# Patient Record
Sex: MALE | Race: WHITE | NOT HISPANIC OR LATINO | Employment: PART TIME | URBAN - METROPOLITAN AREA
[De-identification: names, ages, dates, MRNs, and addresses within clinical notes are randomized per-mention and may not be internally consistent; named-entity substitution may affect disease eponyms.]

---

## 2017-11-01 ENCOUNTER — OFFICE VISIT (OUTPATIENT)
Dept: MEDICAL GROUP | Facility: MEDICAL CENTER | Age: 42
End: 2017-11-01
Attending: INTERNAL MEDICINE
Payer: MEDICAID

## 2017-11-01 VITALS
SYSTOLIC BLOOD PRESSURE: 122 MMHG | HEIGHT: 70 IN | TEMPERATURE: 97.2 F | HEART RATE: 100 BPM | WEIGHT: 186 LBS | OXYGEN SATURATION: 100 % | BODY MASS INDEX: 26.63 KG/M2 | RESPIRATION RATE: 16 BRPM | DIASTOLIC BLOOD PRESSURE: 88 MMHG

## 2017-11-01 DIAGNOSIS — K92.1 HEMATOCHEZIA: ICD-10-CM

## 2017-11-01 DIAGNOSIS — F42.9 OBSESSIVE-COMPULSIVE DISORDER, UNSPECIFIED TYPE: ICD-10-CM

## 2017-11-01 DIAGNOSIS — Z13.1 SCREENING FOR DIABETES MELLITUS: ICD-10-CM

## 2017-11-01 DIAGNOSIS — F10.20 ALCOHOLISM (HCC): ICD-10-CM

## 2017-11-01 DIAGNOSIS — Z11.59 SCREENING FOR VIRAL DISEASE: ICD-10-CM

## 2017-11-01 DIAGNOSIS — Z13.220 SCREENING, LIPID: ICD-10-CM

## 2017-11-01 DIAGNOSIS — K21.9 GASTROESOPHAGEAL REFLUX DISEASE, ESOPHAGITIS PRESENCE NOT SPECIFIED: ICD-10-CM

## 2017-11-01 DIAGNOSIS — Z23 NEED FOR VACCINATION: ICD-10-CM

## 2017-11-01 PROBLEM — F15.21 METHAMPHETAMINE DEPENDENCE IN REMISSION (HCC): Status: ACTIVE | Noted: 2017-11-01

## 2017-11-01 PROBLEM — G47.00 INSOMNIA: Status: ACTIVE | Noted: 2017-11-01

## 2017-11-01 PROCEDURE — 90471 IMMUNIZATION ADMIN: CPT | Performed by: INTERNAL MEDICINE

## 2017-11-01 PROCEDURE — 99204 OFFICE O/P NEW MOD 45 MIN: CPT | Mod: 25 | Performed by: INTERNAL MEDICINE

## 2017-11-01 PROCEDURE — 90715 TDAP VACCINE 7 YRS/> IM: CPT | Performed by: INTERNAL MEDICINE

## 2017-11-01 PROCEDURE — 90686 IIV4 VACC NO PRSV 0.5 ML IM: CPT | Performed by: INTERNAL MEDICINE

## 2017-11-01 PROCEDURE — 99214 OFFICE O/P EST MOD 30 MIN: CPT | Mod: 25 | Performed by: INTERNAL MEDICINE

## 2017-11-01 PROCEDURE — 90732 PPSV23 VACC 2 YRS+ SUBQ/IM: CPT | Performed by: INTERNAL MEDICINE

## 2017-11-01 RX ORDER — BUPROPION HYDROCHLORIDE 150 MG/1
150 TABLET, EXTENDED RELEASE ORAL DAILY
Qty: 30 TAB | Refills: 3 | Status: SHIPPED | OUTPATIENT
Start: 2017-11-01 | End: 2020-02-28 | Stop reason: SDUPTHER

## 2017-11-01 RX ORDER — BUPROPION HYDROCHLORIDE 150 MG/1
150 TABLET, EXTENDED RELEASE ORAL 2 TIMES DAILY
Qty: 60 TAB | Status: SHIPPED | DISCHARGE
Start: 2017-11-01 | End: 2017-11-01

## 2017-11-01 RX ORDER — FLUOXETINE HYDROCHLORIDE 40 MG/1
40 CAPSULE ORAL DAILY
Qty: 30 CAP | Refills: 6 | Status: SHIPPED | OUTPATIENT
Start: 2017-11-01 | End: 2018-11-15

## 2017-11-01 ASSESSMENT — PAIN SCALES - GENERAL: PAINLEVEL: NO PAIN

## 2017-11-01 NOTE — ASSESSMENT & PLAN NOTE
Patient reports suffering from severe GERD for at least 15 years. He's been on omeprazole which he buys over-the-counter daily since then. He states that he does not take a dose of his omeprazole for a day, he will be able to eat because he will have such severe heartburn. While on the medication, he tolerates most food and denies nausea and vomiting. He has never seen a GI specialist or had an upper endoscopy.

## 2017-11-01 NOTE — ASSESSMENT & PLAN NOTE
Patient reports being diagnosed with obsessive-compulsive disorder as an adolescent, but has had this problem since childhood. He started on fluoxetine as a teenager which really helped him. He went off of it for many years, but once he started drinking heavily and using methamphetamine, his OCD traits started to come back very strong. About 3 years ago, he restarted the Prozac and states that he has been stable on it since then. He does have some sexual dysfunction as a side effect for which she takes Wellbutrin. He currently takes 100 mg daily. States this does help a little bit but not dramatically.

## 2017-11-01 NOTE — PROGRESS NOTES
Bernabe Horn III is a 42 y.o. male here for severe acid reflux, refills on medications, hematochezia, establish care    HPI: No previous PCP  GERD (gastroesophageal reflux disease)  Patient reports suffering from severe GERD for at least 15 years. He's been on omeprazole which he buys over-the-counter daily since then. He states that he does not take a dose of his omeprazole for a day, he will be able to eat because he will have such severe heartburn. While on the medication, he tolerates most food and denies nausea and vomiting. He has never seen a GI specialist or had an upper endoscopy.     OCD (obsessive compulsive disorder)  Patient reports being diagnosed with obsessive-compulsive disorder as an adolescent, but has had this problem since childhood. He started on fluoxetine as a teenager which really helped him. He went off of it for many years, but once he started drinking heavily and using methamphetamine, his OCD traits started to come back very strong. About 3 years ago, he restarted the Prozac and states that he has been stable on it since then. He does have some sexual dysfunction as a side effect for which she takes Wellbutrin. He currently takes 100 mg daily. States this does help a little bit but not dramatically.     Hematochezia  Patient reports that several times in the past year, he has had bright red blood in his bowel movements. He states it has been quite a bit of blood, more than just on the toilet paper and a pink tinge to the toilet bowl. He has never gone to the emergency room or sought medical attention for these episodes. His mother was recently diagnosed with stage IV colon cancer and  about a month later at age 60 and he is concerned for this possibility. He denies abdominal pain, unintentional weight loss, fevers, night sweats.    Alcoholism (CMS-[facility])  Patient has a history of heavy drinking. Up until about 3 years ago, he was drinking a fifth of hard alcohol a day. He has  since cut back to about a sixpack of beer a day, but he tends to drink high alcohol-containing beers. He will have some hard alcohol on the weekends. States that he does not have tremors or DT symptoms when he stops drinking.    Current medicines (including changes today)  Current Outpatient Prescriptions   Medication Sig Dispense Refill   • fluoxetine (PROZAC) 40 MG capsule Take 1 Cap by mouth every day. 30 Cap 6   • buPROPion SR (WELLBUTRIN-SR) 150 MG TABLET SR 12 HR sustained-release tablet Take 1 Tab by mouth every day. 30 Tab 3   • omeprazole (PRILOSEC) 20 MG delayed-release capsule Take 20 mg by mouth every day.     • diphenhydrAMINE (BENADRYL) 25 MG TABS Take 75 mg by mouth at bedtime as needed for Sleep.       No current facility-administered medications for this visit.      He  has a past medical history of Asthma; Heart burn; Hyperlipidemia; Obsessive compulsive disorder; and Substance abuse.  He  has a past surgical history that includes external fixator application (Right, 5/22/2015); external fixator removal (Right, 6/5/2015); and ankle orif (Right, 6/5/2015).  Social History   Substance Use Topics   • Smoking status: Current Every Day Smoker     Packs/day: 1.00     Years: 18.00     Types: Cigarettes   • Smokeless tobacco: Never Used   • Alcohol use 25.2 oz/week     42 Cans of beer per week      Comment: 4 years drank 1/5 a day and cut back to 6 pack a day      Social History     Social History Narrative   • No narrative on file     Family History   Problem Relation Age of Onset   • Cancer Mother 60     colon   • Diabetes Mother    • Heart Disease Father    • Hyperlipidemia Father    • Alcohol/Drug Father    • Cancer Maternal Grandfather 75     colon   • Heart Disease Paternal Grandfather    • Alcohol/Drug Paternal Grandfather    • Stroke Neg Hx    • Psychiatry Neg Hx        ROS  As above in HPI  All other systems reviewed and are negative     Objective:     Blood pressure 122/88, pulse 100, temperature  "36.2 °C (97.2 °F), resp. rate 16, height 1.778 m (5' 10\"), weight 84.4 kg (186 lb), SpO2 100 %. Body mass index is 26.69 kg/m².  Physical Exam:    Constitutional: Alert, no distress.  Skin: Warm, dry, good turgor, no rashes in visible areas.  Eye: Equal, round and reactive, conjunctiva clear, lids normal.  ENMT: Lips without lesions, good dentition, oropharynx clear, TM's clear bilaterally.  Neck: Trachea midline, no masses, no thyromegaly. No cervical or supraclavicular lymphadenopathy.  Respiratory: Unlabored respiratory effort, lungs clear to auscultation, no wheezes, no ronchi.  Cardiovascular: Normal S1, S2, no murmur, no edema.  Abdomen: Soft, non-tender, no masses, no hepatosplenomegaly.  Psych: Alert and oriented x3, normal affect and mood.        Assessment and Plan:   The following treatment plan was discussed    1. Gastroesophageal reflux disease, esophagitis presence not specified  Severe, likely related to alcohol intake. I discussed this association with the patient, alcoholic gastritisAs a possible etiology. I do think he would benefit from an upper endoscopy to evaluate for Garvey's esophagus because he's had his symptoms for 15 years. I placed a referral to GI. We will check a CBC to ensure he is not anemic.  Cont PPI  - REFERRAL TO GASTROENTEROLOGY  - CBC WITHOUT DIFFERENTIAL; Future  -continue OTC omeprazole    2. Obsessive-compulsive disorder, unspecified type  Stable, well controlled with Prozac. Patient is requesting a slight increase in his bupropion dose to see if this will help more with his sexual dysfunction. I have increased him up 250 mg once a day of the Wellbutrin.  - fluoxetine (PROZAC) 40 MG capsule; Take 1 Cap by mouth every day.  Dispense: 30 Cap; Refill: 6  - buPROPion SR (WELLBUTRIN-SR) 150 MG TABLET SR 12 HR sustained-release tablet; Take 1 Tab by mouth every day.  Dispense: 30 Tab; Refill: 3    3. Alcoholism (CMS-[facility])  Patient has cut back significantly compared to past but is " still heavy drinker. Discussed further reducing his alcohol consumption. We will obtain a metabolic panel to evaluate for any liver injury.  - COMP METABOLIC PANEL; Future    4. Hematochezia  Although rare, patient has never had any diagnostic or sutures to evaluate his hematochezia and I placed a GI referral today given his family history of colon cancer.  - REFERRAL TO GASTROENTEROLOGY    5. Screening for diabetes mellitus  - HEMOGLOBIN A1C; Future    6. Screening, lipid  Reports being told he had very high cholesterol in the past but never followed up.  - LIPID PROFILE; Future    7. Screening for viral disease  Given history of methamphetamine use and sharing straws, we'll check for hepatitis and HIV  - HEPATITIS PANEL ACUTE(4 COMPONENTS); Future  - HIV ANTIBODIES; Future    8. Need for vaccination  - Flu Quad Inj >3 Year Pre-Filled PF  - Tdap =>6yo IM  - PNEUMOCOCCAL POLYSACCHARIDE VACCINE 23-VALENT =>3YO SQ/IM        Followup: Return in about 6 months (around 5/1/2018), or if symptoms worsen or fail to improve.

## 2017-11-01 NOTE — ASSESSMENT & PLAN NOTE
Patient has a history of heavy drinking. Up until about 3 years ago, he was drinking a fifth of hard alcohol a day. He has since cut back to about a sixpack of beer a day, but he tends to drink high alcohol-containing beers. He will have some hard alcohol on the weekends. States that he does not have tremors or DT symptoms when he stops drinking.

## 2017-11-01 NOTE — ASSESSMENT & PLAN NOTE
Patient reports that several times in the past year, he has had bright red blood in his bowel movements. He states it has been quite a bit of blood, more than just on the toilet paper and a pink tinge to the toilet bowl. He has never gone to the emergency room or sought medical attention for these episodes. His mother was recently diagnosed with stage IV colon cancer and  about a month later at age 60 and he is concerned for this possibility. He denies abdominal pain, unintentional weight loss, fevers, night sweats.

## 2017-11-21 ENCOUNTER — TELEPHONE (OUTPATIENT)
Dept: MEDICAL GROUP | Facility: MEDICAL CENTER | Age: 42
End: 2017-11-21

## 2017-11-21 PROBLEM — E78.5 HYPERLIPIDEMIA: Status: ACTIVE | Noted: 2017-11-21

## 2017-11-21 RX ORDER — ATORVASTATIN CALCIUM 20 MG/1
20 TABLET, FILM COATED ORAL DAILY
Qty: 30 TAB | Refills: 3 | Status: SHIPPED | OUTPATIENT
Start: 2017-11-21 | End: 2017-12-06

## 2017-11-21 NOTE — TELEPHONE ENCOUNTER
Please inform patient his labs showed high cholesterol, and in his situation, we should start him on a medication to reduce his cholesterol in order to prevent strokes and heart attacks in the future.  It is called atorvastatin, and it will be one pill once daily.  I have sent the script to his pharmacy.  Please let him know I am happy to see him for an appointment if he would like to discuss the medication /options before starting it.  All other labs came back normal, including negative hepatitis and HIV testing.    Talia Wilson M.D.

## 2017-12-06 ENCOUNTER — OFFICE VISIT (OUTPATIENT)
Dept: MEDICAL GROUP | Facility: MEDICAL CENTER | Age: 42
End: 2017-12-06
Attending: INTERNAL MEDICINE
Payer: MEDICAID

## 2017-12-06 VITALS
WEIGHT: 186 LBS | TEMPERATURE: 98.6 F | HEIGHT: 70 IN | BODY MASS INDEX: 26.63 KG/M2 | SYSTOLIC BLOOD PRESSURE: 110 MMHG | HEART RATE: 88 BPM | RESPIRATION RATE: 16 BRPM | DIASTOLIC BLOOD PRESSURE: 70 MMHG | OXYGEN SATURATION: 95 %

## 2017-12-06 DIAGNOSIS — Z72.0 TOBACCO USE: ICD-10-CM

## 2017-12-06 DIAGNOSIS — E78.00 PURE HYPERCHOLESTEROLEMIA: ICD-10-CM

## 2017-12-06 DIAGNOSIS — K21.9 GASTROESOPHAGEAL REFLUX DISEASE, ESOPHAGITIS PRESENCE NOT SPECIFIED: ICD-10-CM

## 2017-12-06 PROCEDURE — 99213 OFFICE O/P EST LOW 20 MIN: CPT | Performed by: INTERNAL MEDICINE

## 2017-12-06 PROCEDURE — 99214 OFFICE O/P EST MOD 30 MIN: CPT | Performed by: INTERNAL MEDICINE

## 2017-12-06 RX ORDER — OMEPRAZOLE 20 MG/1
20 TABLET, DELAYED RELEASE ORAL DAILY
Qty: 30 TAB | Refills: 6 | Status: SHIPPED | OUTPATIENT
Start: 2017-12-06 | End: 2018-07-22 | Stop reason: SDUPTHER

## 2017-12-06 ASSESSMENT — PAIN SCALES - GENERAL: PAINLEVEL: NO PAIN

## 2017-12-06 ASSESSMENT — PATIENT HEALTH QUESTIONNAIRE - PHQ9: CLINICAL INTERPRETATION OF PHQ2 SCORE: 0

## 2017-12-06 NOTE — ASSESSMENT & PLAN NOTE
Patient continues to smoke one pack of cigarettes per day. We discussed that his smoking is one of the main  as for elevating his risk of heart attack and stroke and one of the main reasons why I would recommend cholesterol medication. He is interested in cutting back on smoking but not stopping completely at this point.

## 2017-12-06 NOTE — ASSESSMENT & PLAN NOTE
Patient presents today for review of his recent lab work. This was done through [facility]. His ASCVD risk score was calculated at 8.5% and I had recommended he start on Lipitor. He would like to know more about diet changes today. He states that he eats a lot of high calorie processed foods currently. Since finding out about his cholesterol, he has changed his diet to incorporate more fruits and vegetables, food with low cholesterol, and has been eating 3 meals a day instead of 2 large meals a day.

## 2017-12-06 NOTE — ASSESSMENT & PLAN NOTE
Patient reports a daily GERD symptoms if he does not take over-the-counter Prilosec. He has been buying this but is interested in having it prescribed if possible. He has never tried any other medication for heartburn. We discussed that this medication may not be approved by his insurance, and he is okay buying it if this is the case.

## 2017-12-06 NOTE — PROGRESS NOTES
Subjective:   Bernabe Horn III is a 42 y.o. male here today for Follow-up lab results    Hyperlipidemia  Patient presents today for review of his recent lab work. This was done through [facility]. His ASCVD risk score was calculated at 8.5% and I had recommended he start on Lipitor. He would like to know more about diet changes today. He states that he eats a lot of high calorie processed foods currently. Since finding out about his cholesterol, he has changed his diet to incorporate more fruits and vegetables, food with low cholesterol, and has been eating 3 meals a day instead of 2 large meals a day.    Tobacco use  Patient continues to smoke one pack of cigarettes per day. We discussed that his smoking is one of the main  as for elevating his risk of heart attack and stroke and one of the main reasons why I would recommend cholesterol medication. He is interested in cutting back on smoking but not stopping completely at this point.    GERD (gastroesophageal reflux disease)  Patient reports a daily GERD symptoms if he does not take over-the-counter Prilosec. He has been buying this but is interested in having it prescribed if possible. He has never tried any other medication for heartburn. We discussed that this medication may not be approved by his insurance, and he is okay buying it if this is the case.       Current medicines (including changes today)  Current Outpatient Prescriptions   Medication Sig Dispense Refill   • omeprazole (PRILOSEC OTC) 20 MG tablet Take 1 Tab by mouth every day. 30 Tab 6   • fluoxetine (PROZAC) 40 MG capsule Take 1 Cap by mouth every day. 30 Cap 6   • buPROPion SR (WELLBUTRIN-SR) 150 MG TABLET SR 12 HR sustained-release tablet Take 1 Tab by mouth every day. 30 Tab 3   • diphenhydrAMINE (BENADRYL) 25 MG TABS Take 75 mg by mouth at bedtime as needed for Sleep.       No current facility-administered medications for this visit.      He  has a past medical history of Asthma;  "Heart burn; Hyperlipidemia; Obsessive compulsive disorder; and Substance abuse.    ROS   As above in HPI     Objective:     Blood pressure 110/70, pulse 88, temperature 37 °C (98.6 °F), resp. rate 16, height 1.778 m (5' 10\"), weight 84.4 kg (186 lb), SpO2 95 %. Body mass index is 26.69 kg/m².   Physical Exam:  Constitutional: Alert, no distress.  Skin: Warm, dry, good turgor, no rashes in visible areas.  Eye: Equal, round and reactive, conjunctiva clear, lids normal.  Psych: Alert and oriented x3, normal affect and mood.    Results and Imaging:   Labs (see scanned media)  Lipids: , HDL 43, ,   Hepatitis panel negative  HIV negative  CBC and CMP normal    Assessment and Plan:   The following treatment plan was discussed    1. Pure hypercholesterolemia  Discussed with patient decision to start statin based on ASCVD risk score.  We reviewed how this would change if he were to stop smoking or if his cholesterol came down to normal limits. Patient is interested in intensive lifestyle modification for the next 6 months. He has already significantly changed his diet. He would like to hold off on any medication for now and work on quitting smoking and improving his diet. We will repeat his cholesterol in 6 months.  -Hold off on atorvastatin, lifestyle modifications  -Repeat lipids in 6 months    2. Tobacco use  Discussed smoking cessation, patient is willing to cut back but not stop completely at this point. We will continue to encourage him to quit.    3. Gastroesophageal reflux disease, esophagitis presence not specified  Stable, well controlled with omeprazole daily. Prescription sent today however discussed with patient that this may not be covered by insurance.  If this is the case, he is willing to continue paying for it out of pocket.  -Continue omeprazole 20 mg daily      Followup: Return in about 6 months (around 6/6/2018) for hyperlipidemia.         "

## 2018-03-13 PROBLEM — K22.70 BARRETT'S ESOPHAGUS WITHOUT DYSPLASIA: Status: ACTIVE | Noted: 2018-03-13

## 2018-03-23 PROBLEM — K57.30 DIVERTICULOSIS OF LARGE INTESTINE WITHOUT HEMORRHAGE: Status: ACTIVE | Noted: 2018-03-23

## 2018-03-23 PROBLEM — K44.9 HIATAL HERNIA: Status: ACTIVE | Noted: 2018-03-23

## 2018-04-27 ENCOUNTER — OFFICE VISIT (OUTPATIENT)
Dept: MEDICAL GROUP | Facility: MEDICAL CENTER | Age: 43
End: 2018-04-27
Attending: INTERNAL MEDICINE
Payer: MEDICAID

## 2018-04-27 VITALS
TEMPERATURE: 98.1 F | SYSTOLIC BLOOD PRESSURE: 100 MMHG | HEIGHT: 70 IN | RESPIRATION RATE: 16 BRPM | OXYGEN SATURATION: 94 % | WEIGHT: 186 LBS | BODY MASS INDEX: 26.63 KG/M2 | DIASTOLIC BLOOD PRESSURE: 78 MMHG | HEART RATE: 98 BPM

## 2018-04-27 DIAGNOSIS — M79.629 AXILLARY TENDERNESS, UNSPECIFIED LATERALITY: ICD-10-CM

## 2018-04-27 DIAGNOSIS — R51.9 FACIAL PAIN: ICD-10-CM

## 2018-04-27 PROCEDURE — 99212 OFFICE O/P EST SF 10 MIN: CPT | Performed by: INTERNAL MEDICINE

## 2018-04-27 PROCEDURE — 99214 OFFICE O/P EST MOD 30 MIN: CPT | Performed by: INTERNAL MEDICINE

## 2018-04-27 RX ORDER — TRIAMCINOLONE ACETONIDE 1 MG/G
OINTMENT TOPICAL
Qty: 45 G | Refills: 1 | Status: SHIPPED
Start: 2018-04-27 | End: 2020-02-28

## 2018-04-27 ASSESSMENT — PAIN SCALES - GENERAL: PAINLEVEL: 3=SLIGHT PAIN

## 2018-04-27 NOTE — ASSESSMENT & PLAN NOTE
Patient reports that several months ago, he was using his normal deodorant when he started to develop severe itching in both of his armpits. Scratch them quite a bit and they became very red and raw. He stopped using the deodorant and started just using a mild baby shampoo in the armpit region. Over the period of a month or so, the itching went away and the skin healed. He then went back to using the deodorant and started to develop a similar reaction. He tried some athlete's foot cream without improvement. He denies any significant abscess formation or drainage.  He has stopped using the deodorant again and is improving slightly.

## 2018-04-27 NOTE — ASSESSMENT & PLAN NOTE
Patient reports that for about the past month, he has had pain over his right maxillary region radiating back to his ear. States that preceding this, he was having severe pain in his teeth. He went to the dentist and was told that he had a fractured an impacted wisdom tooth. He is in the process of getting insurance approval to have it removed. He reports that the pain from this subsided but then he started to have a much less intense pain similar to what he is experiencing today. He denies any upper respiratory symptoms including runny nose, cough, fevers, chills, ear pain or drainage. He does not believe that he clenches his teeth at night although he does state that with his OCD he grinds and has some jaw movements that are accessory.

## 2018-04-27 NOTE — PROGRESS NOTES
Subjective:   Bernabe Horn III is a 42 y.o. male here today for facial pain, armpit itching    Facial pain  Patient reports that for about the past month, he has had pain over his right maxillary region radiating back to his ear. States that preceding this, he was having severe pain in his teeth. He went to the dentist and was told that he had a fractured an impacted wisdom tooth. He is in the process of getting insurance approval to have it removed. He reports that the pain from this subsided but then he started to have a much less intense pain similar to what he is experiencing today. He denies any upper respiratory symptoms including runny nose, cough, fevers, chills, ear pain or drainage. He does not believe that he clenches his teeth at night although he does state that with his OCD he grinds and has some jaw movements that are accessory.     Sore armpit  Patient reports that several months ago, he was using his normal deodorant when he started to develop severe itching in both of his armpits. Scratch them quite a bit and they became very red and raw. He stopped using the deodorant and started just using a mild baby shampoo in the armpit region. Over the period of a month or so, the itching went away and the skin healed. He then went back to using the deodorant and started to develop a similar reaction. He tried some athlete's foot cream without improvement. He denies any significant abscess formation or drainage.  He has stopped using the deodorant again and is improving slightly.       Current medicines (including changes today)  Current Outpatient Prescriptions   Medication Sig Dispense Refill   • triamcinolone acetonide (KENALOG) 0.1 % Ointment Apply thin layer to rash beneath arm pits twice daily as needed 45 g 1   • omeprazole (PRILOSEC OTC) 20 MG tablet Take 1 Tab by mouth every day. 30 Tab 6   • fluoxetine (PROZAC) 40 MG capsule Take 1 Cap by mouth every day. 30 Cap 6   • buPROPion SR  "(WELLBUTRIN-SR) 150 MG TABLET SR 12 HR sustained-release tablet Take 1 Tab by mouth every day. 30 Tab 3   • diphenhydrAMINE (BENADRYL) 25 MG TABS Take 75 mg by mouth at bedtime as needed for Sleep.       No current facility-administered medications for this visit.      He  has a past medical history of Asthma; Heart burn; Hyperlipidemia; Obsessive compulsive disorder; and Substance abuse.    ROS   As above in HPI     Objective:     Blood pressure 100/78, pulse 98, temperature 36.7 °C (98.1 °F), resp. rate 16, height 1.778 m (5' 10\"), weight 84.4 kg (186 lb), SpO2 94 %. Body mass index is 26.69 kg/m².   Physical Exam:  Constitutional: Alert, no distress.  Skin: Warm, dry, good turgor, red mildly erythematous skin in axillary region without obvious abscess, no adenopathy, some excoriations..  Eye: Equal, round and reactive, conjunctiva clear, lids normal.  ENMT: Lips without lesions, fair dentition, no obvious dental abscess, no tenderness to palpation over frontal or maxillary sinuses bilaterally, TM's clear bilaterally, nose normal, no jaw clicking/catching/popping, no tenderness to palpation over TMJ  Psych: Alert and oriented x3, normal affect and mood.      Assessment and Plan:   The following treatment plan was discussed    1. Facial pain  Evidence of acute sinusitis on physical exam and history is not suggestive of this. Differential would also include referred dental pain and he does have a known impacted wisdom tooth. Concern for TMJ symptoms as well although patient does not believe he clenches his jaw a lot. No evidence of infection at this time. I encouraged him to follow-up with his dentist and to proceed with this symptom tooth extraction as this may resolve his pain. We also discussed wearing a  if he starts developing worsening jaw symptoms. He will let us know if this is ineffective.  -Follow-up with dentistry for tooth extraction    2. Axillary tenderness, unspecified laterality  Appears " to be a hypersensitivity reaction, likely to a component of the deodorant. We discussed avoiding use of the deodorant and we will try a topical corticosteroid. Encouraged patient to use twice daily for a week and if no improvement to let us know. There is no evidence of abscess or infection.  - triamcinolone acetonide (KENALOG) 0.1 % Ointment; Apply thin layer to rash beneath arm pits twice daily as needed  Dispense: 45 g; Refill: 1        Followup: Return if symptoms worsen or fail to improve.

## 2018-07-24 RX ORDER — OMEPRAZOLE 20 MG/1
CAPSULE, DELAYED RELEASE ORAL
Qty: 30 CAP | Refills: 5 | Status: SHIPPED | OUTPATIENT
Start: 2018-07-24 | End: 2019-07-27 | Stop reason: SDUPTHER

## 2018-11-14 DIAGNOSIS — F42.9 OBSESSIVE-COMPULSIVE DISORDER, UNSPECIFIED TYPE: ICD-10-CM

## 2018-11-15 ENCOUNTER — TELEPHONE (OUTPATIENT)
Dept: MEDICAL GROUP | Facility: MEDICAL CENTER | Age: 43
End: 2018-11-15

## 2018-11-15 RX ORDER — FLUOXETINE HYDROCHLORIDE 20 MG/1
40 CAPSULE ORAL DAILY
Qty: 60 CAP | Refills: 11 | Status: SHIPPED | OUTPATIENT
Start: 2018-11-15 | End: 2019-12-31 | Stop reason: SDUPTHER

## 2018-11-15 RX ORDER — FLUOXETINE HYDROCHLORIDE 20 MG/1
CAPSULE ORAL
Qty: 60 CAP | Refills: 5 | OUTPATIENT
Start: 2018-11-15

## 2018-11-15 NOTE — TELEPHONE ENCOUNTER
1. Name: Bernabe      Call Back Number: [phone]  Patient approves a detailed voicemail message: yes    2. Which medication(s) is being requested?   FLUoxetine HCl 20 MG TAKE TWO CAPSULES BY MOUTH DAILY     3. What is the preferred Pharmacy?   [id number] - [place], [place] - [address] AT [place]    Patient was informed they may receive a return phone call from our office with any additional questions before processing this request.

## 2018-11-16 NOTE — TELEPHONE ENCOUNTER
We have in our system that he was on 40 mg capsules 1 daily however patient reports to 20 mg capsules daily so this is what I have ordered.  Refill has been sent.    Talia Wilson M.D.

## 2018-11-16 NOTE — TELEPHONE ENCOUNTER
Already responded to request.  See telephone encounter 11/15/2018.  Medication refill has been sent.  Talia Wilson M.D.

## 2019-07-30 RX ORDER — OMEPRAZOLE 20 MG/1
CAPSULE, DELAYED RELEASE ORAL
Qty: 30 CAP | Refills: 5 | Status: SHIPPED | OUTPATIENT
Start: 2019-07-30 | End: 2020-02-28 | Stop reason: SDUPTHER

## 2019-12-24 RX ORDER — FLUOXETINE HYDROCHLORIDE 20 MG/1
CAPSULE ORAL
Qty: 60 CAP | Refills: 10 | OUTPATIENT
Start: 2019-12-24

## 2019-12-30 ENCOUNTER — TELEPHONE (OUTPATIENT)
Dept: MEDICAL GROUP | Facility: MEDICAL CENTER | Age: 44
End: 2019-12-30

## 2019-12-30 NOTE — TELEPHONE ENCOUNTER
Pt's wife lvm stating that pt no longer has medicaid and he has an appt on 01/09/2020 to see his new pcp Dr. Mauro. She states that he is out of prozac and is wondering if Dr. Wilson would be willing to do a refill on this med until he can see the new doctoe. Please advise- cb # is [phone] or [phone]

## 2019-12-31 RX ORDER — FLUOXETINE HYDROCHLORIDE 20 MG/1
40 CAPSULE ORAL DAILY
Qty: 60 CAP | Refills: 0 | Status: SHIPPED | OUTPATIENT
Start: 2019-12-31 | End: 2020-02-28 | Stop reason: SDUPTHER

## 2019-12-31 NOTE — TELEPHONE ENCOUNTER
Yes, no problem.  Refill sent for 30 days to his pharmacy.  Please inform patient.  Talia Wilson M.D.

## 2020-02-28 ENCOUNTER — OFFICE VISIT (OUTPATIENT)
Dept: MEDICAL GROUP | Facility: MEDICAL CENTER | Age: 45
End: 2020-02-28
Attending: INTERNAL MEDICINE
Payer: MEDICAID

## 2020-02-28 VITALS
TEMPERATURE: 97.9 F | RESPIRATION RATE: 16 BRPM | HEART RATE: 60 BPM | BODY MASS INDEX: 27.35 KG/M2 | SYSTOLIC BLOOD PRESSURE: 112 MMHG | WEIGHT: 191 LBS | HEIGHT: 70 IN | OXYGEN SATURATION: 100 % | DIASTOLIC BLOOD PRESSURE: 72 MMHG

## 2020-02-28 DIAGNOSIS — Z13.1 SCREENING FOR DIABETES MELLITUS: ICD-10-CM

## 2020-02-28 DIAGNOSIS — K21.9 GASTROESOPHAGEAL REFLUX DISEASE, ESOPHAGITIS PRESENCE NOT SPECIFIED: ICD-10-CM

## 2020-02-28 DIAGNOSIS — E78.00 PURE HYPERCHOLESTEROLEMIA: ICD-10-CM

## 2020-02-28 DIAGNOSIS — R06.83 SNORING: ICD-10-CM

## 2020-02-28 DIAGNOSIS — K92.1 HEMATOCHEZIA: ICD-10-CM

## 2020-02-28 DIAGNOSIS — K22.70 BARRETT'S ESOPHAGUS WITHOUT DYSPLASIA: ICD-10-CM

## 2020-02-28 DIAGNOSIS — F42.9 OBSESSIVE-COMPULSIVE DISORDER, UNSPECIFIED TYPE: ICD-10-CM

## 2020-02-28 DIAGNOSIS — F10.20 ALCOHOLISM (HCC): ICD-10-CM

## 2020-02-28 DIAGNOSIS — R06.01 ORTHOPNEA: ICD-10-CM

## 2020-02-28 PROBLEM — M79.629: Status: RESOLVED | Noted: 2018-04-27 | Resolved: 2020-02-28

## 2020-02-28 PROBLEM — R51.9 FACIAL PAIN: Status: RESOLVED | Noted: 2018-04-27 | Resolved: 2020-02-28

## 2020-02-28 PROCEDURE — 99212 OFFICE O/P EST SF 10 MIN: CPT | Performed by: INTERNAL MEDICINE

## 2020-02-28 PROCEDURE — 99214 OFFICE O/P EST MOD 30 MIN: CPT | Performed by: INTERNAL MEDICINE

## 2020-02-28 RX ORDER — OMEPRAZOLE 20 MG/1
20 CAPSULE, DELAYED RELEASE ORAL DAILY
Qty: 30 CAP | Refills: 5 | Status: SHIPPED | OUTPATIENT
Start: 2020-02-28 | End: 2020-12-09

## 2020-02-28 RX ORDER — FLUOXETINE HYDROCHLORIDE 20 MG/1
40 CAPSULE ORAL DAILY
Qty: 60 CAP | Refills: 5 | Status: SHIPPED | OUTPATIENT
Start: 2020-02-28 | End: 2021-08-13

## 2020-02-28 RX ORDER — BUPROPION HYDROCHLORIDE 150 MG/1
150 TABLET, EXTENDED RELEASE ORAL DAILY
Qty: 30 TAB | Refills: 5 | Status: SHIPPED | OUTPATIENT
Start: 2020-02-28 | End: 2021-08-13

## 2020-02-28 ASSESSMENT — PATIENT HEALTH QUESTIONNAIRE - PHQ9: CLINICAL INTERPRETATION OF PHQ2 SCORE: 0

## 2020-02-28 ASSESSMENT — PAIN SCALES - GENERAL: PAINLEVEL: NO PAIN

## 2020-02-29 NOTE — ASSESSMENT & PLAN NOTE
He has a history of hyperlipidemia with an ASCVD risk score of 8.5 based on last labs however has declined cholesterol medication in the past.

## 2020-02-29 NOTE — ASSESSMENT & PLAN NOTE
He remains on Prozac 40 mg daily which he reports control his symptoms well.  He takes the Wellbutrin 150 mg daily to help with the sexual side effects of the Prozac.

## 2020-02-29 NOTE — ASSESSMENT & PLAN NOTE
States that he is continued to have fairly regular episodes of hematochezia.  He has a history of hemorrhoids.  He did have a colonoscopy in 2018 which showed 1 polyp but no obvious source of bleeding.

## 2020-02-29 NOTE — ASSESSMENT & PLAN NOTE
He reports excessive daytime sleepiness, nocturnal awakenings with shortness of breath, and observed orthopneic events by his wife.  His wife tells him he is a very heavy snorer when he sleeps.  He has never been evaluated for sleep apnea.

## 2020-02-29 NOTE — ASSESSMENT & PLAN NOTE
He is currently drinking 12-15 beers per night.  States these are the high gravity beers.  He reports that about 5 years ago he was drinking 1/5 of hard alcohol daily.  At that time, he was getting withdrawal symptoms when he stopped drinking and was having morning drinks.  Now, he reports no alcohol in the mornings and denies withdrawal symptoms if he does stop drinking.

## 2020-02-29 NOTE — ASSESSMENT & PLAN NOTE
He has a history of Garvey's esophagus.  His last EGD was in February 2018 and GI had recommended at that time yearly surveillance EGDs.  He has been without insurance so was unable to follow-up.  He has remained on the Prilosec 20 mg daily.  He reports his GERD symptoms are well controlled on this regimen.

## 2020-02-29 NOTE — PROGRESS NOTES
Subjective:   Bernabe Horn III is a 44 y.o. male here today for shortness of breath while sleeping at night, medication refills, chronic medical problems as below    Alcoholism (CMS-HCC) (HCC)  He is currently drinking 12-15 beers per night.  States these are the high gravity beers.  He reports that about 5 years ago he was drinking 1/5 of hard alcohol daily.  At that time, he was getting withdrawal symptoms when he stopped drinking and was having morning drinks.  Now, he reports no alcohol in the mornings and denies withdrawal symptoms if he does stop drinking.      Hematochezia  States that he is continued to have fairly regular episodes of hematochezia.  He has a history of hemorrhoids.  He did have a colonoscopy in 2018 which showed 1 polyp but no obvious source of bleeding.      Garvey's esophagus without dysplasia  He has a history of Garvey's esophagus.  His last EGD was in February 2018 and GI had recommended at that time yearly surveillance EGDs.  He has been without insurance so was unable to follow-up.  He has remained on the Prilosec 20 mg daily.  He reports his GERD symptoms are well controlled on this regimen.    Hyperlipidemia  He has a history of hyperlipidemia with an ASCVD risk score of 8.5 based on last labs however has declined cholesterol medication in the past.    OCD (obsessive compulsive disorder)  He remains on Prozac 40 mg daily which he reports control his symptoms well.  He takes the Wellbutrin 150 mg daily to help with the sexual side effects of the Prozac.    Snoring  He reports excessive daytime sleepiness, nocturnal awakenings with shortness of breath, and observed orthopneic events by his wife.  His wife tells him he is a very heavy snorer when he sleeps.  He has never been evaluated for sleep apnea.         Current medicines (including changes today)  Current Outpatient Medications   Medication Sig Dispense Refill   • omeprazole (PRILOSEC) 20 MG delayed-release capsule  Take 1 Cap by mouth every day. 30 Cap 5   • FLUoxetine (PROZAC) 20 MG Cap Take 2 Caps by mouth every day. 60 Cap 5   • buPROPion SR (WELLBUTRIN-SR) 150 MG TABLET SR 12 HR sustained-release tablet Take 1 Tab by mouth every day. 30 Tab 5   • diphenhydrAMINE (BENADRYL) 25 MG TABS Take 75 mg by mouth at bedtime as needed for Sleep.       No current facility-administered medications for this visit.      He  has a past medical history of Asthma, Heart burn, Hyperlipidemia, Obsessive compulsive disorder, and Substance abuse (HCC).    ROS   Denies chest pain, abdominal pain  As above in HPI     Objective:     Vitals:    02/28/20 1556   BP: 112/72   Pulse: 60   Resp: 16   Temp: 36.6 °C (97.9 °F)   SpO2: 100%     Body mass index is 27.41 kg/m².   Physical Exam:  Constitutional: Alert, no distress.  Skin: Warm, dry, good turgor, no rashes in visible areas.  Eye: Equal, round and reactive, conjunctiva clear, lids normal.  ENMT: Tonsils are not enlarged  Neck: Trachea midline, no masses, no thyromegaly. No cervical or supraclavicular lymphadenopathy  Respiratory: Unlabored respiratory effort, lungs clear to auscultation, no wheezes, no ronchi.  Cardiovascular: Regular rate and rhythm, no murmurs appreciated, no lower extremity edema  Abdomen: Soft, non-tender, no masses, no hepatosplenomegaly.  Psych: Alert and oriented x3, normal affect and mood.        Assessment and Plan:   The following treatment plan was discussed    1. Obsessive-compulsive disorder, unspecified type  Stable, well-controlled with current meds which were refilled today  - FLUoxetine (PROZAC) 20 MG Cap; Take 2 Caps by mouth every day.  Dispense: 60 Cap; Refill: 5  - buPROPion SR (WELLBUTRIN-SR) 150 MG TABLET SR 12 HR sustained-release tablet; Take 1 Tab by mouth every day.  Dispense: 30 Tab; Refill: 5    2. Gastroesophageal reflux disease, esophagitis presence not specified  Stable, well-controlled with current meds which were refilled today  - omeprazole  (PRILOSEC) 20 MG delayed-release capsule; Take 1 Cap by mouth every day.  Dispense: 30 Cap; Refill: 5  - Comp Metabolic Panel; Future    3. Garvey's esophagus without dysplasia  He is due for surveillance EGD.  New referral to GI placed since his last visit was 2 years ago  - omeprazole (PRILOSEC) 20 MG delayed-release capsule; Take 1 Cap by mouth every day.  Dispense: 30 Cap; Refill: 5  - REFERRAL TO GASTROENTEROLOGY    4. Alcoholism (HCC)  We discussed the importance of cutting back on his alcohol use.  Discussed that this is likely contributing to not only his heartburn but also his sleeping issues and that sleep apnea, if present, is made significantly worse by alcohol.  At this point, he is not interested in cessation.    5. Hematochezia  With a normal colonoscopy 2 years ago.  However, encouraged him to discuss this with the GI provider when he does follow-up.  We will get a CBC to make sure blood counts are normal.  -He will discuss further with GI  - CBC WITH DIFFERENTIAL; Future    6. Snoring  High suspicion for MARIA D.  We have referred him to sleep medicine for further work-up.  - REFERRAL TO SLEEP STUDIES    7. Orthopnea  - REFERRAL TO SLEEP STUDIES    8. Pure hypercholesterolemia  Currently off of lipid-lowering medication  - Lipid Profile; Future    9. Screening for diabetes mellitus  - HEMOGLOBIN A1C; Future        Followup: Return in about 1 year (around 2/28/2021), or if symptoms worsen or fail to improve.

## 2020-04-28 PROBLEM — G47.33 OSA (OBSTRUCTIVE SLEEP APNEA): Status: ACTIVE | Noted: 2020-02-28

## 2020-04-28 PROBLEM — R06.83 SNORING: Status: RESOLVED | Noted: 2020-02-28 | Resolved: 2020-04-28

## 2020-12-09 DIAGNOSIS — K21.9 GASTROESOPHAGEAL REFLUX DISEASE: ICD-10-CM

## 2020-12-09 DIAGNOSIS — K22.70 BARRETT'S ESOPHAGUS WITHOUT DYSPLASIA: ICD-10-CM

## 2020-12-09 RX ORDER — OMEPRAZOLE 20 MG/1
CAPSULE, DELAYED RELEASE ORAL
Qty: 30 CAP | Refills: 4 | Status: SHIPPED | OUTPATIENT
Start: 2020-12-09 | End: 2021-05-18

## 2021-05-17 DIAGNOSIS — K22.70 BARRETT'S ESOPHAGUS WITHOUT DYSPLASIA: ICD-10-CM

## 2021-05-17 DIAGNOSIS — K21.9 GASTROESOPHAGEAL REFLUX DISEASE: ICD-10-CM

## 2021-05-18 RX ORDER — OMEPRAZOLE 20 MG/1
CAPSULE, DELAYED RELEASE ORAL
Qty: 30 CAPSULE | Refills: 11 | Status: SHIPPED | OUTPATIENT
Start: 2021-05-18 | End: 2022-03-17 | Stop reason: SDUPTHER

## 2021-08-13 ENCOUNTER — OFFICE VISIT (OUTPATIENT)
Dept: MEDICAL GROUP | Facility: MEDICAL CENTER | Age: 46
End: 2021-08-13
Attending: INTERNAL MEDICINE
Payer: MEDICAID

## 2021-08-13 VITALS
RESPIRATION RATE: 16 BRPM | WEIGHT: 185 LBS | BODY MASS INDEX: 26.48 KG/M2 | SYSTOLIC BLOOD PRESSURE: 128 MMHG | DIASTOLIC BLOOD PRESSURE: 82 MMHG | HEIGHT: 70 IN | OXYGEN SATURATION: 98 % | HEART RATE: 94 BPM | TEMPERATURE: 97.7 F

## 2021-08-13 DIAGNOSIS — Z13.1 SCREENING FOR DIABETES MELLITUS: ICD-10-CM

## 2021-08-13 DIAGNOSIS — K22.70 BARRETT'S ESOPHAGUS WITHOUT DYSPLASIA: ICD-10-CM

## 2021-08-13 DIAGNOSIS — G47.33 OSA (OBSTRUCTIVE SLEEP APNEA): ICD-10-CM

## 2021-08-13 DIAGNOSIS — F10.20 ALCOHOLISM (HCC): ICD-10-CM

## 2021-08-13 DIAGNOSIS — F42.9 OBSESSIVE-COMPULSIVE DISORDER, UNSPECIFIED TYPE: ICD-10-CM

## 2021-08-13 DIAGNOSIS — E78.00 PURE HYPERCHOLESTEROLEMIA: ICD-10-CM

## 2021-08-13 DIAGNOSIS — Z72.0 TOBACCO USE: ICD-10-CM

## 2021-08-13 DIAGNOSIS — R06.02 SHORTNESS OF BREATH: ICD-10-CM

## 2021-08-13 PROBLEM — K92.1 HEMATOCHEZIA: Status: RESOLVED | Noted: 2017-11-01 | Resolved: 2021-08-13

## 2021-08-13 PROCEDURE — 99214 OFFICE O/P EST MOD 30 MIN: CPT | Performed by: INTERNAL MEDICINE

## 2021-08-13 PROCEDURE — 99212 OFFICE O/P EST SF 10 MIN: CPT | Performed by: INTERNAL MEDICINE

## 2021-08-13 RX ORDER — NALTREXONE HYDROCHLORIDE 50 MG/1
50 TABLET, FILM COATED ORAL DAILY
Qty: 30 TABLET | Refills: 3 | Status: SHIPPED
Start: 2021-08-13 | End: 2022-03-17

## 2021-08-13 ASSESSMENT — PATIENT HEALTH QUESTIONNAIRE - PHQ9: CLINICAL INTERPRETATION OF PHQ2 SCORE: 0

## 2021-08-14 PROBLEM — R06.02 SHORTNESS OF BREATH: Status: ACTIVE | Noted: 2021-08-14

## 2021-08-14 NOTE — ASSESSMENT & PLAN NOTE
He has a history of Garvey's esophagus with no dysplasia.  His last EGD done in February 2021 showed metaplasia and they recommended he repeat it in 2024.  He continues on omeprazole 20 mg daily and reports severe heartburn if he does not take this medication.  GI has recommended he stay on it for lifetime.

## 2021-08-14 NOTE — PROGRESS NOTES
Subjective:   Bernabe Horn III is a 45 y.o. male here today for shortness of breath, chronic issues below    Tobacco use  Currently smoking 1 pack/day.  He reports increasing shortness of breath lately.  He is interested in cutting back on his smoking but not ready to quit fully.  He declines any prescriptions for nicotine replacement or medications to help with smoking cessation.      Alcoholism (CMS-HCC) (HCC)  He continues to drink quite heavily, on average 10-12 drinks a day.  He is interested in cutting back.  He would like to try naltrexone.  He has never used this medication before.  He is not part of any support group or counseling at this time.    Hyperlipidemia  Currently off of statin therapy.  Due for updated labs.    Garvey's esophagus without dysplasia  He has a history of Garvey's esophagus with no dysplasia.  His last EGD done in February 2021 showed metaplasia and they recommended he repeat it in 2024.  He continues on omeprazole 20 mg daily and reports severe heartburn if he does not take this medication.  GI has recommended he stay on it for lifetime.    MARIA D (obstructive sleep apnea)  He had a sleep study done in April 2020 which showed moderate MARIA D with an AHI of 16.7.  He has a CPAP machine at home but he does not use it regularly.  States that he has had difficulty adjusting to the mask and he has tried both nasal pillow and a facemask.  He reports daytime sleepiness and feeling constantly fatigued.    OCD (obsessive compulsive disorder)  Was previously taking fluoxetine and Wellbutrin however stopped these medications several months ago and feels like he is doing well.    Shortness of breath  Reports progressively worsening shortness of breath.  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  He has a long smoking history and is currently smoking 1 pack/day.  He wonders if he may  have developed COPD.  He does not currently use any inhalers.  Reports being diagnosed with asthma as a child but never taking any inhalers regularly.         Current medicines (including changes today)  Current Outpatient Medications   Medication Sig Dispense Refill   • naltrexone (DEPADE) 50 MG Tab Take 1 Tablet by mouth every day. 30 Tablet 3   • omeprazole (PRILOSEC) 20 MG delayed-release capsule TAKE ONE CAPSULE BY MOUTH DAILY 30 capsule 11     No current facility-administered medications for this visit.     He  has a past medical history of Asthma, Heart burn, Hyperlipidemia, Obsessive compulsive disorder, and Substance abuse (HCC).    ROS   As above in HPI     Objective:     Vitals:    08/13/21 1708   BP: 128/82   Pulse: 94   Resp: 16   Temp: 36.5 °C (97.7 °F)   SpO2: 98%     Body mass index is 26.54 kg/m².   Physical Exam:  Constitutional: Alert, no distress.  Skin: Warm, dry, good turgor, no rashes in visible areas.  Eye: Equal, round and reactive, conjunctiva clear, lids normal.  Respiratory: Unlabored respiratory effort, lungs clear to auscultation, no wheezes, no ronchi.  Cardiovascular: Regular rate and rhythm, no murmurs appreciated, no lower extremity edema  Abdomen: Soft, non-tender, no masses, no hepatosplenomegaly.  Psych: Alert and oriented x3, normal affect and mood.    Assessment and Plan:   The following treatment plan was discussed    1. MARIA D (obstructive sleep apnea)  Uncontrolled as he is not using his CPAP machine.  Encouraged him to retry his mask and to get in touch with his sleep specialist if he is unable to tolerate the therapy to discuss pressure adjustment.  He is willing to do so.  We also discussed getting used to the mask by wearing it for several minutes during the day.  -Patient encouraged to restart CPAP therapy and follow-up with sleep medicine    2. Pure hypercholesterolemia  Will likely need statin therapy but we will obtain updated labs  - Lipid Profile; Future    3.  Screening for diabetes mellitus  - HEMOGLOBIN A1C; Future    4. Alcoholism (HCC)  He would like to try naltrexone to see if it will help with his cravings.  Encouraged him also to reach out to a support group and gave him multiple resources.  - Comp Metabolic Panel; Future  - CBC WITH DIFFERENTIAL; Future  - naltrexone (DEPADE) 50 MG Tab; Take 1 Tablet by mouth every day.  Dispense: 30 Tablet; Refill: 3    5. Tobacco use  Continues to smoke 1 pack/day.  He will work on cutting back but declines any prescriptions today    6. Shortness of breath  Suspect underlying COPD with some component of reactive airway disease given his history of heavy smoking and asthma.  We will obtain pulmonary function testing and start inhalers if appropriate.  - PULMONARY FUNCTION TESTS -Test requested: Complete Pulmonary Function Test; Future    7. Garvey's esophagus without dysplasia  Stable, continue lifetime PPI  -Omeprazole 20 mg daily  -Follow-up with GI for repeat endoscopy in 2024    8. Obsessive-compulsive disorder, unspecified type  Stable off of medication.  We will continue to monitor.        Followup: Return in about 6 months (around 2/13/2022), or if symptoms worsen or fail to improve.

## 2021-08-14 NOTE — ASSESSMENT & PLAN NOTE
He had a sleep study done in April 2020 which showed moderate MARIA D with an AHI of 16.7.  He has a CPAP machine at home but he does not use it regularly.  States that he has had difficulty adjusting to the mask and he has tried both nasal pillow and a facemask.  He reports daytime sleepiness and feeling constantly fatigued.

## 2021-08-14 NOTE — ASSESSMENT & PLAN NOTE
He continues to drink quite heavily, on average 10-12 drinks a day.  He is interested in cutting back.  He would like to try naltrexone.  He has never used this medication before.  He is not part of any support group or counseling at this time.

## 2021-08-14 NOTE — ASSESSMENT & PLAN NOTE
Reports progressively worsening shortness of breath.  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  He has a long smoking history and is currently smoking 1 pack/day.  He wonders if he may have developed COPD.  He does not currently use any inhalers.  Reports being diagnosed with asthma as a child but never taking any inhalers regularly.

## 2021-08-14 NOTE — ASSESSMENT & PLAN NOTE
Was previously taking fluoxetine and Wellbutrin however stopped these medications several months ago and feels like he is doing well.

## 2021-08-14 NOTE — ASSESSMENT & PLAN NOTE
Currently smoking 1 pack/day.  He reports increasing shortness of breath lately.  He is interested in cutting back on his smoking but not ready to quit fully.  He declines any prescriptions for nicotine replacement or medications to help with smoking cessation.

## 2022-03-10 ENCOUNTER — TELEPHONE (OUTPATIENT)
Dept: MEDICAL GROUP | Facility: MEDICAL CENTER | Age: 47
End: 2022-03-10
Payer: MEDICAID

## 2022-03-10 LAB
ALBUMIN SERPL-MCNC: 4.8 G/DL (ref 4–5)
ALBUMIN/GLOB SERPL: 2.2 {RATIO} (ref 1.2–2.2)
ALP SERPL-CCNC: 69 IU/L (ref 44–121)
ALT SERPL-CCNC: 49 IU/L (ref 0–44)
AST SERPL-CCNC: 36 IU/L (ref 0–40)
BASOPHILS # BLD AUTO: 0.1 X10E3/UL (ref 0–0.2)
BASOPHILS NFR BLD AUTO: 1 %
BILIRUB SERPL-MCNC: 0.4 MG/DL (ref 0–1.2)
BUN SERPL-MCNC: 11 MG/DL (ref 6–24)
BUN/CREAT SERPL: 14 (ref 9–20)
CALCIUM SERPL-MCNC: 9.5 MG/DL (ref 8.7–10.2)
CHLORIDE SERPL-SCNC: 102 MMOL/L (ref 96–106)
CHOLEST SERPL-MCNC: 323 MG/DL (ref 100–199)
CO2 SERPL-SCNC: 24 MMOL/L (ref 20–29)
CREAT SERPL-MCNC: 0.8 MG/DL (ref 0.76–1.27)
EGFRCR SERPLBLD CKD-EPI 2021: 110 ML/MIN/1.73
EOSINOPHIL # BLD AUTO: 0.3 X10E3/UL (ref 0–0.4)
EOSINOPHIL NFR BLD AUTO: 4 %
ERYTHROCYTE [DISTWIDTH] IN BLOOD BY AUTOMATED COUNT: 14.2 % (ref 11.6–15.4)
GLOBULIN SER CALC-MCNC: 2.2 G/DL (ref 1.5–4.5)
GLUCOSE SERPL-MCNC: 151 MG/DL (ref 65–99)
HBA1C MFR BLD: 7.4 % (ref 4.8–5.6)
HCT VFR BLD AUTO: 41.7 % (ref 37.5–51)
HDLC SERPL-MCNC: 43 MG/DL
HGB BLD-MCNC: 14 G/DL (ref 13–17.7)
IMM GRANULOCYTES # BLD AUTO: 0 X10E3/UL (ref 0–0.1)
IMM GRANULOCYTES NFR BLD AUTO: 0 %
IMMATURE CELLS  115398: NORMAL
LABORATORY COMMENT REPORT: ABNORMAL
LDLC SERPL CALC-MCNC: 210 MG/DL (ref 0–99)
LYMPHOCYTES # BLD AUTO: 2.1 X10E3/UL (ref 0.7–3.1)
LYMPHOCYTES NFR BLD AUTO: 33 %
MCH RBC QN AUTO: 28.5 PG (ref 26.6–33)
MCHC RBC AUTO-ENTMCNC: 33.6 G/DL (ref 31.5–35.7)
MCV RBC AUTO: 85 FL (ref 79–97)
MONOCYTES # BLD AUTO: 0.6 X10E3/UL (ref 0.1–0.9)
MONOCYTES NFR BLD AUTO: 10 %
MORPHOLOGY BLD-IMP: NORMAL
NEUTROPHILS # BLD AUTO: 3.4 X10E3/UL (ref 1.4–7)
NEUTROPHILS NFR BLD AUTO: 52 %
NRBC BLD AUTO-RTO: NORMAL %
PLATELET # BLD AUTO: 232 X10E3/UL (ref 150–450)
POTASSIUM SERPL-SCNC: 4.4 MMOL/L (ref 3.5–5.2)
PROT SERPL-MCNC: 7 G/DL (ref 6–8.5)
RBC # BLD AUTO: 4.91 X10E6/UL (ref 4.14–5.8)
SODIUM SERPL-SCNC: 140 MMOL/L (ref 134–144)
TRIGL SERPL-MCNC: 342 MG/DL (ref 0–149)
VLDLC SERPL CALC-MCNC: 70 MG/DL (ref 5–40)
WBC # BLD AUTO: 6.5 X10E3/UL (ref 3.4–10.8)

## 2022-03-10 NOTE — TELEPHONE ENCOUNTER
----- Message from Talia Wilson M.D. sent at 3/10/2022 12:20 PM PST -----  Please inform patient labs came back abnormal.  Please schedule for follow up visit.  Blood work shows that he has developed diabetes and also that his cholesterol is extremely high, both of which we need to treat.  Please give him information for switching Medicaid plans if he is still on HPN.

## 2022-03-17 ENCOUNTER — OFFICE VISIT (OUTPATIENT)
Dept: MEDICAL GROUP | Facility: MEDICAL CENTER | Age: 47
End: 2022-03-17
Attending: INTERNAL MEDICINE
Payer: MEDICAID

## 2022-03-17 VITALS
RESPIRATION RATE: 16 BRPM | BODY MASS INDEX: 26.34 KG/M2 | SYSTOLIC BLOOD PRESSURE: 128 MMHG | TEMPERATURE: 97.9 F | OXYGEN SATURATION: 99 % | DIASTOLIC BLOOD PRESSURE: 90 MMHG | HEIGHT: 70 IN | WEIGHT: 184 LBS | HEART RATE: 100 BPM

## 2022-03-17 DIAGNOSIS — K22.70 BARRETT'S ESOPHAGUS WITHOUT DYSPLASIA: ICD-10-CM

## 2022-03-17 DIAGNOSIS — E78.00 PURE HYPERCHOLESTEROLEMIA: ICD-10-CM

## 2022-03-17 DIAGNOSIS — F10.20 ALCOHOLISM (HCC): ICD-10-CM

## 2022-03-17 DIAGNOSIS — K21.9 GASTROESOPHAGEAL REFLUX DISEASE: ICD-10-CM

## 2022-03-17 DIAGNOSIS — Z72.0 TOBACCO USE: ICD-10-CM

## 2022-03-17 DIAGNOSIS — I10 PRIMARY HYPERTENSION: ICD-10-CM

## 2022-03-17 DIAGNOSIS — E11.9 TYPE 2 DIABETES MELLITUS WITHOUT COMPLICATION, WITHOUT LONG-TERM CURRENT USE OF INSULIN (HCC): ICD-10-CM

## 2022-03-17 PROBLEM — R06.02 SHORTNESS OF BREATH: Status: RESOLVED | Noted: 2021-08-14 | Resolved: 2022-03-17

## 2022-03-17 PROCEDURE — 99214 OFFICE O/P EST MOD 30 MIN: CPT | Performed by: INTERNAL MEDICINE

## 2022-03-17 PROCEDURE — 99213 OFFICE O/P EST LOW 20 MIN: CPT | Performed by: INTERNAL MEDICINE

## 2022-03-17 RX ORDER — METFORMIN HYDROCHLORIDE 500 MG/1
500 TABLET, EXTENDED RELEASE ORAL 2 TIMES DAILY
Qty: 60 TABLET | Refills: 2 | Status: SHIPPED
Start: 2022-03-17 | End: 2022-03-22

## 2022-03-17 RX ORDER — NICOTINE 21 MG/24HR
1 PATCH, TRANSDERMAL 24 HOURS TRANSDERMAL EVERY 24 HOURS
Qty: 28 PATCH | Refills: 0 | Status: SHIPPED | OUTPATIENT
Start: 2022-03-17 | End: 2023-03-29

## 2022-03-17 RX ORDER — LOSARTAN POTASSIUM 50 MG/1
50 TABLET ORAL DAILY
Qty: 30 TABLET | Refills: 3 | Status: SHIPPED | OUTPATIENT
Start: 2022-03-17 | End: 2022-07-28

## 2022-03-17 RX ORDER — OMEPRAZOLE 20 MG/1
20 CAPSULE, DELAYED RELEASE ORAL DAILY
Qty: 30 CAPSULE | Refills: 11 | Status: SHIPPED | OUTPATIENT
Start: 2022-03-17 | End: 2023-03-29 | Stop reason: SDUPTHER

## 2022-03-17 RX ORDER — ATORVASTATIN CALCIUM 40 MG/1
40 TABLET, FILM COATED ORAL NIGHTLY
Qty: 30 TABLET | Refills: 5 | Status: SHIPPED | OUTPATIENT
Start: 2022-03-17 | End: 2022-10-06

## 2022-03-17 ASSESSMENT — FIBROSIS 4 INDEX: FIB4 SCORE: 1.04

## 2022-03-17 NOTE — ASSESSMENT & PLAN NOTE
He continues to have about 8-10 drinks a night, typically beers.  In the past, we had prescribed naltrexone but he states that he got very sick after taking this medication once and has not tried it again.

## 2022-03-17 NOTE — ASSESSMENT & PLAN NOTE
This is a new diagnosis for the patient.  His A1c on most recent labs is 7.4.  He has been prediabetic in the past.  He reports recently cutting back on fast food but he was eating this about 4-5 times per week out of convenience.  He does not drink soda.  He is still having 8-10 drinks a night, usually beers.  Lately, he has been eating more vegetables and proteins and trying to reduce his carbs.

## 2022-03-17 NOTE — PROGRESS NOTES
Subjective:   Bernabe Horn III is a 46 y.o. male here today for lab review, HTN    Alcoholism (CMS-HCC) (HCC)  He continues to have about 8-10 drinks a night, typically beers.  In the past, we had prescribed naltrexone but he states that he got very sick after taking this medication once and has not tried it again.     Tobacco use  Currently smoking 1 pack/day.  Has been a smoker since age 14 and has never successfully quit.  He is interested in doing so.  He has tried nicotine patches before but not consistently.      Type 2 diabetes mellitus without complication, without long-term current use of insulin ([facility])  This is a new diagnosis for the patient.  His A1c on most recent labs is 7.4.  He has been prediabetic in the past.  He reports recently cutting back on fast food but he was eating this about 4-5 times per week out of convenience.  He does not drink soda.  He is still having 8-10 drinks a night, usually beers.  Lately, he has been eating more vegetables and proteins and trying to reduce his carbs.    Primary hypertension  He has been tracking blood pressure at home and he brings in readings today.  Over the past month, readings are as follows: 129/97, 139/98, 124/92, 121/81, 122/96, 139/97, 122/87, 125/88, 119/87, 125/91, 121/92.  He has never been on any medication for blood pressure control.    Hyperlipidemia  Most recent labs show very elevated lipids with a total cholesterol of 323 and an LDL of 210.  He does report a poor diet but he is working on changing that.  In the past, I had recommended statin therapy for him due to his very high LDL and he does also have a family history of early cardiovascular disease.  He believes his father passed away from a heart attack at age 56.  He chose not to take the statin and wanted to work on lifestyle modifications.  At this point he realizes that he was not successful and he is interested in starting medication.       Current medicines (including  changes today)  Current Outpatient Medications   Medication Sig Dispense Refill   • metFORMIN ER (GLUCOPHAGE XR) 500 MG TABLET SR 24 HR Take 1 Tablet by mouth 2 times a day. 60 Tablet 2   • omeprazole (PRILOSEC) 20 MG delayed-release capsule Take 1 Capsule by mouth every day. 30 Capsule 11   • atorvastatin (LIPITOR) 40 MG Tab Take 1 Tablet by mouth every evening. 30 Tablet 5   • losartan (COZAAR) 50 MG Tab Take 1 Tablet by mouth every day. 30 Tablet 3   • nicotine (NICODERM) 21 MG/24HR PATCH 24 HR Place 1 Patch on the skin every 24 hours. 28 Patch 0   • nicotine (NICODERM) 14 MG/24HR PATCH 24 HR Place 1 Patch on the skin every 24 hours. 28 Patch 0   • nicotine (NICODERM) 7 MG/24HR PATCH 24 HR Place 1 Patch on the skin every 24 hours. 28 Patch 0     No current facility-administered medications for this visit.     He  has a past medical history of Asthma, Heart burn, Hyperlipidemia, Obsessive compulsive disorder, and Substance abuse (HCC).         Objective:     Vitals:    03/17/22 0701   BP: 128/90   Pulse: 100   Resp: 16   Temp: 36.6 °C (97.9 °F)   SpO2: 99%     Body mass index is 26.4 kg/m².   Physical Exam:  Constitutional: Alert, no distress.  Skin: Warm, dry, good turgor, no rashes in visible areas.  Eye: Equal, round and reactive, conjunctiva clear, lids normal.  Psych: Alert and oriented x3, normal affect and mood.      Results and Imaging:   Orders Only on 03/09/2022   Component Date Value Ref Range Status   • WBC 03/09/2022 6.5  3.4 - 10.8 x10E3/uL Final   • RBC 03/09/2022 4.91  4.14 - 5.80 x10E6/uL Final   • Hemoglobin 03/09/2022 14.0  13.0 - 17.7 g/dL Final   • Hematocrit 03/09/2022 41.7  37.5 - 51.0 % Final   • MCV 03/09/2022 85  79 - 97 fL Final   • MCH 03/09/2022 28.5  26.6 - 33.0 pg Final   • MCHC 03/09/2022 33.6  31.5 - 35.7 g/dL Final   • RDW 03/09/2022 14.2  11.6 - 15.4 % Final   • Platelet Count 03/09/2022 232  150 - 450 x10E3/uL Final   • Neutrophils-Polys 03/09/2022 52  Not Estab. % Final   •  Lymphocytes 03/09/2022 33  Not Estab. % Final   • Monocytes 03/09/2022 10  Not Estab. % Final   • Eosinophils 03/09/2022 4  Not Estab. % Final   • Basophils 03/09/2022 1  Not Estab. % Final   • Immature Cells 03/09/2022 CANCELED   Final    Result canceled by the ancillary.   • Neutrophils (Absolute) 03/09/2022 3.4  1.4 - 7.0 x10E3/uL Final   • Lymphs (Absolute) 03/09/2022 2.1  0.7 - 3.1 x10E3/uL Final   • Monos (Absolute) 03/09/2022 0.6  0.1 - 0.9 x10E3/uL Final   • Eos (Absolute) 03/09/2022 0.3  0.0 - 0.4 x10E3/uL Final   • Baso (Absolute) 03/09/2022 0.1  0.0 - 0.2 x10E3/uL Final   • Immature Granulocytes 03/09/2022 0  Not Estab. % Final   • Immature Granulocytes (abs) 03/09/2022 0.0  0.0 - 0.1 x10E3/uL Final   • Nucleated RBC 03/09/2022 CANCELED   Final    Result canceled by the ancillary.   • Comments-Diff 03/09/2022 CANCELED   Final    Result canceled by the ancillary.   • Glucose 03/09/2022 151 (A) 65 - 99 mg/dL Final   • Bun 03/09/2022 11  6 - 24 mg/dL Final   • Creatinine 03/09/2022 0.80  0.76 - 1.27 mg/dL Final   • eGFR 03/09/2022 110  >59 mL/min/1.73 Final   • Bun-Creatinine Ratio 03/09/2022 14  9 - 20 Final   • Sodium 03/09/2022 140  134 - 144 mmol/L Final   • Potassium 03/09/2022 4.4  3.5 - 5.2 mmol/L Final   • Chloride 03/09/2022 102  96 - 106 mmol/L Final   • Co2 03/09/2022 24  20 - 29 mmol/L Final   • Calcium 03/09/2022 9.5  8.7 - 10.2 mg/dL Final   • Total Protein 03/09/2022 7.0  6.0 - 8.5 g/dL Final   • Albumin 03/09/2022 4.8  4.0 - 5.0 g/dL Final   • Globulin 03/09/2022 2.2  1.5 - 4.5 g/dL Final   • A-G Ratio 03/09/2022 2.2  1.2 - 2.2 Final   • Total Bilirubin 03/09/2022 0.4  0.0 - 1.2 mg/dL Final   • Alkaline Phosphatase 03/09/2022 69  44 - 121 IU/L Final   • AST(SGOT) 03/09/2022 36  0 - 40 IU/L Final   • ALT(SGPT) 03/09/2022 49 (A) 0 - 44 IU/L Final   • Cholesterol,Tot 03/09/2022 323 (A) 100 - 199 mg/dL Final   • Triglycerides 03/09/2022 342 (A) 0 - 149 mg/dL Final   • HDL 03/09/2022 43  >39 mg/dL  Final   • VLDL Cholesterol Calc 03/09/2022 70 (A) 5 - 40 mg/dL Final   • LDL Chol Calc ([facility]) 03/09/2022 210 (A) 0 - 99 mg/dL Final   • Comment: 03/09/2022 CANCELED   Final    Result canceled by the ancillary.   • Glycohemoglobin 03/09/2022 7.4 (A) 4.8 - 5.6 % Final    Comment: Prediabetes: 5.7 - 6.4  Diabetes: >6.4  Glycemic control for adults with diabetes: <7.0           Assessment and Plan:   The following treatment plan was discussed    1. Pure hypercholesterolemia  Given comorbid diabetes, tobacco use, and very elevated cholesterol levels, will start on atorvastatin  - atorvastatin (LIPITOR) 40 MG Tab; Take 1 Tablet by mouth every evening.  Dispense: 30 Tablet; Refill: 5    2. Gastroesophageal reflux disease  Stable, continues on lifelong PPI per GI  - omeprazole (PRILOSEC) 20 MG delayed-release capsule; Take 1 Capsule by mouth every day.  Dispense: 30 Capsule; Refill: 11    3. Garvey's esophagus without dysplasia  - omeprazole (PRILOSEC) 20 MG delayed-release capsule; Take 1 Capsule by mouth every day.  Dispense: 30 Capsule; Refill: 11    4. Primary hypertension  Based on blood pressures, we will start him on losartan 50 mg daily.  He will continue home blood pressure monitoring.  - losartan (COZAAR) 50 MG Tab; Take 1 Tablet by mouth every day.  Dispense: 30 Tablet; Refill: 3    5. Alcoholism (HCC)  Side effects with naltrexone.  We discussed possibly trying a lower dose versus alternative medications however would like to get him stabilized on his new medications first and then address at next visit    6. Type 2 diabetes mellitus without complication, without long-term current use of insulin (HCC)  New diagnosis, will start on Metformin.  Dietary modifications discussed.  - metFORMIN ER (GLUCOPHAGE XR) 500 MG TABLET SR 24 HR; Take 1 Tablet by mouth 2 times a day.  Dispense: 60 Tablet; Refill: 2  - atorvastatin (LIPITOR) 40 MG Tab; Take 1 Tablet by mouth every evening.  Dispense: 30 Tablet; Refill: 5    7.  Tobacco use  We will start on nicotine patches.  He will get OTC lozenges or gum to supplement.  We discussed that smoking cessation is the most beneficial thing he can do for his blood pressure and also to reduce his cardiovascular risk.  - nicotine (NICODERM) 21 MG/24HR PATCH 24 HR; Place 1 Patch on the skin every 24 hours.  Dispense: 28 Patch; Refill: 0  - nicotine (NICODERM) 14 MG/24HR PATCH 24 HR; Place 1 Patch on the skin every 24 hours.  Dispense: 28 Patch; Refill: 0  - nicotine (NICODERM) 7 MG/24HR PATCH 24 HR; Place 1 Patch on the skin every 24 hours.  Dispense: 28 Patch; Refill: 0        Followup: Return in about 4 weeks (around 4/14/2022) for hypertension, diabetes.

## 2022-03-17 NOTE — ASSESSMENT & PLAN NOTE
Most recent labs show very elevated lipids with a total cholesterol of 323 and an LDL of 210.  He does report a poor diet but he is working on changing that.  In the past, I had recommended statin therapy for him due to his very high LDL and he does also have a family history of early cardiovascular disease.  He believes his father passed away from a heart attack at age 56.  He chose not to take the statin and wanted to work on lifestyle modifications.  At this point he realizes that he was not successful and he is interested in starting medication.

## 2022-03-17 NOTE — ASSESSMENT & PLAN NOTE
He has been tracking blood pressure at home and he brings in readings today.  Over the past month, readings are as follows: 129/97, 139/98, 124/92, 121/81, 122/96, 139/97, 122/87, 125/88, 119/87, 125/91, 121/92.  He has never been on any medication for blood pressure control.

## 2022-03-17 NOTE — ASSESSMENT & PLAN NOTE
Currently smoking 1 pack/day.  Has been a smoker since age 14 and has never successfully quit.  He is interested in doing so.  He has tried nicotine patches before but not consistently.

## 2022-03-19 ENCOUNTER — PATIENT MESSAGE (OUTPATIENT)
Dept: MEDICAL GROUP | Facility: MEDICAL CENTER | Age: 47
End: 2022-03-19
Payer: MEDICAID

## 2022-03-19 DIAGNOSIS — E11.9 TYPE 2 DIABETES MELLITUS WITHOUT COMPLICATION, WITHOUT LONG-TERM CURRENT USE OF INSULIN (HCC): ICD-10-CM

## 2022-03-23 RX ORDER — EMPAGLIFLOZIN 25 MG/1
1 TABLET, FILM COATED ORAL DAILY
Qty: 30 TABLET | Refills: 3 | Status: SHIPPED
Start: 2022-03-23 | End: 2022-04-20

## 2022-04-04 ENCOUNTER — APPOINTMENT (OUTPATIENT)
Dept: PULMONOLOGY | Facility: MEDICAL CENTER | Age: 47
End: 2022-04-04
Payer: MEDICAID

## 2022-04-20 ENCOUNTER — OFFICE VISIT (OUTPATIENT)
Dept: MEDICAL GROUP | Facility: MEDICAL CENTER | Age: 47
End: 2022-04-20
Attending: INTERNAL MEDICINE
Payer: MEDICAID

## 2022-04-20 VITALS
WEIGHT: 176 LBS | HEART RATE: 92 BPM | RESPIRATION RATE: 16 BRPM | SYSTOLIC BLOOD PRESSURE: 112 MMHG | OXYGEN SATURATION: 96 % | DIASTOLIC BLOOD PRESSURE: 78 MMHG | HEIGHT: 70 IN | BODY MASS INDEX: 25.2 KG/M2 | TEMPERATURE: 97.9 F

## 2022-04-20 DIAGNOSIS — I10 PRIMARY HYPERTENSION: ICD-10-CM

## 2022-04-20 DIAGNOSIS — E78.00 PURE HYPERCHOLESTEROLEMIA: ICD-10-CM

## 2022-04-20 DIAGNOSIS — E11.9 TYPE 2 DIABETES MELLITUS WITHOUT COMPLICATION, WITHOUT LONG-TERM CURRENT USE OF INSULIN (HCC): ICD-10-CM

## 2022-04-20 LAB
HBA1C MFR BLD: 6.1 % (ref 0–5.6)
INT CON NEG: NEGATIVE
INT CON POS: POSITIVE

## 2022-04-20 PROCEDURE — 83036 HEMOGLOBIN GLYCOSYLATED A1C: CPT | Performed by: INTERNAL MEDICINE

## 2022-04-20 PROCEDURE — 99213 OFFICE O/P EST LOW 20 MIN: CPT | Performed by: INTERNAL MEDICINE

## 2022-04-20 PROCEDURE — 99214 OFFICE O/P EST MOD 30 MIN: CPT | Performed by: INTERNAL MEDICINE

## 2022-04-20 ASSESSMENT — FIBROSIS 4 INDEX: FIB4 SCORE: 1.04

## 2022-04-20 NOTE — PROGRESS NOTES
Subjective:   Bernabe Horn III is a 46 y.o. male here today for f/u DM, HTN, HLD    Type 2 diabetes mellitus without complication, without long-term current use of insulin (Formerly [facility])  He presents today for diabetes follow-up.  His last A1c was 7.4.  Since then he has made extensive dietary changes, has eliminated most carbs and sugars, and has lost 10 pounds.  He has remained off of medication.  His A1c in clinic today is 6.1.    Primary hypertension  At last visit, we started him on losartan 50 mg daily.  He reports good blood pressure control on this medication and blood pressure is at goal in clinic today.    Hyperlipidemia  At last visit, we started him on atorvastatin 40 mg daily which he has been tolerating well.         Current medicines (including changes today)  Current Outpatient Medications   Medication Sig Dispense Refill   • omeprazole (PRILOSEC) 20 MG delayed-release capsule Take 1 Capsule by mouth every day. 30 Capsule 11   • atorvastatin (LIPITOR) 40 MG Tab Take 1 Tablet by mouth every evening. 30 Tablet 5   • losartan (COZAAR) 50 MG Tab Take 1 Tablet by mouth every day. 30 Tablet 3   • nicotine (NICODERM) 21 MG/24HR PATCH 24 HR Place 1 Patch on the skin every 24 hours. 28 Patch 0   • nicotine (NICODERM) 14 MG/24HR PATCH 24 HR Place 1 Patch on the skin every 24 hours. 28 Patch 0   • nicotine (NICODERM) 7 MG/24HR PATCH 24 HR Place 1 Patch on the skin every 24 hours. 28 Patch 0     No current facility-administered medications for this visit.     He  has a past medical history of Asthma, Heart burn, Hyperlipidemia, Obsessive compulsive disorder, and Substance abuse (Formerly [facility]).         Objective:     Vitals:    04/20/22 0815   BP: 112/78   Pulse: 92   Resp: 16   Temp: 36.6 °C (97.9 °F)   SpO2: 96%     Body mass index is 25.25 kg/m².   Physical Exam:  Constitutional: Alert, no distress.  Skin: Warm, dry, good turgor, no rashes in visible areas.  Eye: Equal, round and reactive, conjunctiva clear, lids  normal.  Psych: Alert and oriented x3, normal affect and mood.      Results and Imaging:   Lab Results   Component Value Date/Time    HBA1C 6.1 (A) 04/20/2022 08:18 AM          Assessment and Plan:   The following treatment plan was discussed    1. Type 2 diabetes mellitus without complication, without long-term current use of insulin (HCC)  Significant improvement with dietary and lifestyle changes.  We will continue to monitor off of medication.  - POCT  A1C    2. Primary hypertension  Stable, controlled with current meds  -Continue losartan 50 mg daily    3. Pure hypercholesterolemia  He will need labs in another few months to assess response to atorvastatin.  -Continue atorvastatin 40 mg daily with repeat labs in 1 to 3 months        Followup: Return if symptoms worsen or fail to improve.  No follow-up was scheduled with patient today as he will be switching to private insurance come May 1.  He is aware that he will need to establish care with a new PCP.

## 2022-04-20 NOTE — ASSESSMENT & PLAN NOTE
At last visit, we started him on losartan 50 mg daily.  He reports good blood pressure control on this medication and blood pressure is at goal in clinic today.

## 2022-04-20 NOTE — ASSESSMENT & PLAN NOTE
He presents today for diabetes follow-up.  His last A1c was 7.4.  Since then he has made extensive dietary changes, has eliminated most carbs and sugars, and has lost 10 pounds.  He has remained off of medication.  His A1c in clinic today is 6.1.

## 2022-05-28 ENCOUNTER — APPOINTMENT (OUTPATIENT)
Dept: PULMONOLOGY | Facility: MEDICAL CENTER | Age: 47
End: 2022-05-28
Payer: MEDICAID

## 2022-07-28 DIAGNOSIS — E11.9 TYPE 2 DIABETES MELLITUS WITHOUT COMPLICATION, WITHOUT LONG-TERM CURRENT USE OF INSULIN (HCC): ICD-10-CM

## 2022-07-28 DIAGNOSIS — I10 PRIMARY HYPERTENSION: ICD-10-CM

## 2022-07-28 RX ORDER — LOSARTAN POTASSIUM 50 MG/1
TABLET ORAL
Qty: 30 TABLET | Refills: 3 | Status: SHIPPED | OUTPATIENT
Start: 2022-07-28 | End: 2022-12-01

## 2022-07-28 NOTE — TELEPHONE ENCOUNTER
Received request via: Pharmacy    Was the patient seen in the last year in this department? Yes    Does the patient have an active prescription (recently filled or refills available) for medication(s) requested? No     Last visit 4/20/22

## 2022-10-05 DIAGNOSIS — E78.00 PURE HYPERCHOLESTEROLEMIA: ICD-10-CM

## 2022-10-05 DIAGNOSIS — E11.9 TYPE 2 DIABETES MELLITUS WITHOUT COMPLICATION, WITHOUT LONG-TERM CURRENT USE OF INSULIN (HCC): ICD-10-CM

## 2022-10-06 RX ORDER — ATORVASTATIN CALCIUM 40 MG/1
TABLET, FILM COATED ORAL
Qty: 30 TABLET | Refills: 3 | Status: SHIPPED | OUTPATIENT
Start: 2022-10-06 | End: 2023-02-14

## 2022-12-01 DIAGNOSIS — E11.9 TYPE 2 DIABETES MELLITUS WITHOUT COMPLICATION, WITHOUT LONG-TERM CURRENT USE OF INSULIN (HCC): ICD-10-CM

## 2022-12-01 DIAGNOSIS — I10 PRIMARY HYPERTENSION: ICD-10-CM

## 2022-12-01 RX ORDER — LOSARTAN POTASSIUM 50 MG/1
TABLET ORAL
Qty: 30 TABLET | Refills: 3 | Status: SHIPPED | OUTPATIENT
Start: 2022-12-01 | End: 2023-03-29 | Stop reason: SDUPTHER

## 2022-12-01 NOTE — TELEPHONE ENCOUNTER
Received request via: Pharmacy    Was the patient seen in the last year in this department? Yes    Does the patient have an active prescription (recently filled or refills available) for medication(s) requested? No    Does the patient have retirement Plus and need 100 day supply (blood pressure, diabetes and cholesterol meds only)? Patient does not have SCP

## 2023-02-14 DIAGNOSIS — E78.00 PURE HYPERCHOLESTEROLEMIA: ICD-10-CM

## 2023-02-14 DIAGNOSIS — E11.9 TYPE 2 DIABETES MELLITUS WITHOUT COMPLICATION, WITHOUT LONG-TERM CURRENT USE OF INSULIN (HCC): ICD-10-CM

## 2023-02-14 RX ORDER — ATORVASTATIN CALCIUM 40 MG/1
TABLET, FILM COATED ORAL
Qty: 30 TABLET | Refills: 0 | Status: SHIPPED | OUTPATIENT
Start: 2023-02-14 | End: 2023-03-17

## 2023-02-14 NOTE — TELEPHONE ENCOUNTER
Received request via: Pharmacy    Was the patient seen in the last year in this department? Yes    Does the patient have an active prescription (recently filled or refills available) for medication(s) requested? No    Does the patient have custodial Plus and need 100 day supply (blood pressure, diabetes and cholesterol meds only)? Patient does not have SCP   No appt scheduled at this time

## 2023-03-16 DIAGNOSIS — E78.00 PURE HYPERCHOLESTEROLEMIA: ICD-10-CM

## 2023-03-16 DIAGNOSIS — E11.9 TYPE 2 DIABETES MELLITUS WITHOUT COMPLICATION, WITHOUT LONG-TERM CURRENT USE OF INSULIN (HCC): ICD-10-CM

## 2023-03-17 RX ORDER — ATORVASTATIN CALCIUM 40 MG/1
TABLET, FILM COATED ORAL
Qty: 30 TABLET | Refills: 0 | Status: SHIPPED | OUTPATIENT
Start: 2023-03-17 | End: 2023-03-29 | Stop reason: SDUPTHER

## 2023-03-17 NOTE — TELEPHONE ENCOUNTER
Received request via: Pharmacy    Was the patient seen in the last year in this department? Yes    Does the patient have an active prescription (recently filled or refills available) for medication(s) requested? No    Does the patient have care home Plus and need 100 day supply (blood pressure, diabetes and cholesterol meds only)? Patient does not have SCP   Will send [employer] message to advise the schedule an appt for further refills

## 2023-03-29 ENCOUNTER — OFFICE VISIT (OUTPATIENT)
Dept: MEDICAL GROUP | Facility: MEDICAL CENTER | Age: 48
End: 2023-03-29
Attending: INTERNAL MEDICINE
Payer: MEDICAID

## 2023-03-29 VITALS
WEIGHT: 176 LBS | HEART RATE: 88 BPM | HEIGHT: 70 IN | RESPIRATION RATE: 16 BRPM | TEMPERATURE: 98.1 F | OXYGEN SATURATION: 99 % | DIASTOLIC BLOOD PRESSURE: 90 MMHG | BODY MASS INDEX: 25.2 KG/M2 | SYSTOLIC BLOOD PRESSURE: 130 MMHG

## 2023-03-29 DIAGNOSIS — K21.9 GASTROESOPHAGEAL REFLUX DISEASE, UNSPECIFIED WHETHER ESOPHAGITIS PRESENT: ICD-10-CM

## 2023-03-29 DIAGNOSIS — E78.00 PURE HYPERCHOLESTEROLEMIA: ICD-10-CM

## 2023-03-29 DIAGNOSIS — I10 PRIMARY HYPERTENSION: ICD-10-CM

## 2023-03-29 DIAGNOSIS — K22.70 BARRETT'S ESOPHAGUS WITHOUT DYSPLASIA: ICD-10-CM

## 2023-03-29 DIAGNOSIS — E11.9 TYPE 2 DIABETES MELLITUS WITHOUT COMPLICATION, WITHOUT LONG-TERM CURRENT USE OF INSULIN (HCC): ICD-10-CM

## 2023-03-29 DIAGNOSIS — F32.A ANXIETY AND DEPRESSION: ICD-10-CM

## 2023-03-29 DIAGNOSIS — Z12.11 SCREEN FOR COLON CANCER: ICD-10-CM

## 2023-03-29 DIAGNOSIS — F41.9 ANXIETY AND DEPRESSION: ICD-10-CM

## 2023-03-29 DIAGNOSIS — Z11.59 ENCOUNTER FOR HEPATITIS C SCREENING TEST FOR LOW RISK PATIENT: ICD-10-CM

## 2023-03-29 DIAGNOSIS — K92.1 HEMATOCHEZIA: ICD-10-CM

## 2023-03-29 DIAGNOSIS — Z72.0 TOBACCO USE: ICD-10-CM

## 2023-03-29 PROCEDURE — 99214 OFFICE O/P EST MOD 30 MIN: CPT | Performed by: INTERNAL MEDICINE

## 2023-03-29 PROCEDURE — 99213 OFFICE O/P EST LOW 20 MIN: CPT | Performed by: INTERNAL MEDICINE

## 2023-03-29 RX ORDER — LOSARTAN POTASSIUM 50 MG/1
50 TABLET ORAL DAILY
Qty: 30 TABLET | Refills: 11 | Status: SHIPPED | OUTPATIENT
Start: 2023-03-29

## 2023-03-29 RX ORDER — NICOTINE 21 MG/24HR
1 PATCH, TRANSDERMAL 24 HOURS TRANSDERMAL EVERY 24 HOURS
Qty: 28 PATCH | Refills: 0 | Status: SHIPPED | OUTPATIENT
Start: 2023-03-29

## 2023-03-29 RX ORDER — OMEPRAZOLE 20 MG/1
20 CAPSULE, DELAYED RELEASE ORAL DAILY
Qty: 30 CAPSULE | Refills: 11 | Status: SHIPPED | OUTPATIENT
Start: 2023-03-29

## 2023-03-29 RX ORDER — ATORVASTATIN CALCIUM 40 MG/1
40 TABLET, FILM COATED ORAL EVERY EVENING
Qty: 30 TABLET | Refills: 11 | Status: SHIPPED | OUTPATIENT
Start: 2023-03-29 | End: 2023-11-13 | Stop reason: SDUPTHER

## 2023-03-29 ASSESSMENT — FIBROSIS 4 INDEX: FIB4 SCORE: 1.06

## 2023-03-29 ASSESSMENT — PATIENT HEALTH QUESTIONNAIRE - PHQ9
5. POOR APPETITE OR OVEREATING: 0 - NOT AT ALL
CLINICAL INTERPRETATION OF PHQ2 SCORE: 2
SUM OF ALL RESPONSES TO PHQ QUESTIONS 1-9: 7

## 2023-03-29 NOTE — PROGRESS NOTES
Subjective:   Bernabe Horn III is a 47 y.o. male here today for med refills, labs, yearly check up    Anxiety and depression  Reports symptoms have been present for about a year.  He wondered if it was his atorvastatin and losartan because they started around the same time he began taking these medications, but he also reports a lot of life changes  And stressors.  At this point, he is not interested in starting on any medication or seeing a therapist.    GERD (gastroesophageal reflux disease)  He continues to drink alcohol daily.  He is taking Prilosec 20 mg daily which was recommended for lifetime use by GI.  Denies significant heartburn or dysphagia.    Primary hypertension  Continues on losartan 50 mg daily.  Blood pressure mildly elevated today at 130/90 but he reports majority of home readings in the 120s over 70s to 80s.    Tobacco use  Still smoking about a pack a day.  Never tried the nicotine patches prescribed last year.  States that he is feeling more motivated to quit and he would like a new prescription for patches.    Type 2 diabetes mellitus without complication, without long-term current use of insulin ([facility])  Last A1c was checked about 11 months ago and was 6.1 with dietary and lifestyle modifications down from 7.4 previously.  He states that his diet has gotten a little more relaxed since then but he has not gained any additional weight.  Not currently on any medication for glycemic control.    Hematochezia  Still reporting regularly seeing blood in his bowel movements, typically bright red and surrounding the stool.  States that his GI told him it was likely from over wiping.  Denies any worsening in the bleeding over the past 2 years, pain associated with BM's, unintentional weight loss.  Usually has diarrhea.       Current medicines (including changes today)  Current Outpatient Medications   Medication Sig Dispense Refill    omeprazole (PRILOSEC) 20 MG delayed-release capsule Take 1  Capsule by mouth every day. 30 Capsule 11    losartan (COZAAR) 50 MG Tab Take 1 Tablet by mouth every day. 30 Tablet 11    atorvastatin (LIPITOR) 40 MG Tab Take 1 Tablet by mouth every evening. 30 Tablet 11    nicotine (NICODERM) 21 MG/24HR PATCH 24 HR Place 1 Patch on the skin every 24 hours. 28 Patch 0    nicotine (NICODERM) 14 MG/24HR PATCH 24 HR Place 1 Patch on the skin every 24 hours. 28 Patch 0    nicotine (NICODERM) 7 MG/24HR PATCH 24 HR Place 1 Patch on the skin every 24 hours. 28 Patch 0     No current facility-administered medications for this visit.     He  has a past medical history of Asthma, Heart burn, Hyperlipidemia, Obsessive compulsive disorder, and Substance abuse (HCC).         Objective:     Vitals:    03/29/23 0940   BP: (!) 130/90   Pulse: 88   Resp: 16   Temp: 36.7 °C (98.1 °F)   SpO2: 99%     Body mass index is 25.25 kg/m².   Physical Exam:  Constitutional: Alert, no distress.  Skin: Warm, dry, good turgor, no rashes in visible areas.  Eye: Equal, round and reactive, conjunctiva clear, lids normal.  ENMT: Lips without lesions, good dentition, oropharynx clear, TM's clear bilaterally  Neck: Trachea midline, no masses, no thyromegaly. No cervical or supraclavicular lymphadenopathy  Respiratory: Unlabored respiratory effort, lungs clear to auscultation, no wheezes, no ronchi.  Cardiovascular: Regular rate and rhythm, no murmurs appreciated, no lower extremity edema  Abdomen: Soft, non-tender, no masses, no hepatosplenomegaly.  Psych: Alert and oriented x3, normal affect and mood.    Assessment and Plan:   The following treatment plan was discussed    1. Type 2 diabetes mellitus without complication, without long-term current use of insulin (HCC)  Currently diet controlled.  We will obtain updated labs.  - HEMOGLOBIN A1C; Future  - MICROALBUMIN CREAT RATIO URINE; Future  - Lipid Profile; Future  - Comp Metabolic Panel; Future  - losartan (COZAAR) 50 MG Tab; Take 1 Tablet by mouth every day.   Dispense: 30 Tablet; Refill: 11  - atorvastatin (LIPITOR) 40 MG Tab; Take 1 Tablet by mouth every evening.  Dispense: 30 Tablet; Refill: 11    2. Pure hypercholesterolemia  Stable, will obtain updated labs  - Lipid Profile; Future  - atorvastatin (LIPITOR) 40 MG Tab; Take 1 Tablet by mouth every evening.  Dispense: 30 Tablet; Refill: 11    3. Primary hypertension  Stable, controlled per pt based on home readings.    - Comp Metabolic Panel; Future  - losartan (COZAAR) 50 MG Tab; Take 1 Tablet by mouth every day.  Dispense: 30 Tablet; Refill: 11    4. Garvey's esophagus without dysplasia  - omeprazole (PRILOSEC) 20 MG delayed-release capsule; Take 1 Capsule by mouth every day.  Dispense: 30 Capsule; Refill: 11    5. Screen for colon cancer  - Referral to GI for Colonoscopy    6. Encounter for hepatitis C screening test for low risk patient  - HEP C VIRUS ANTIBODY; Future    7. Tobacco use  - nicotine (NICODERM) 21 MG/24HR PATCH 24 HR; Place 1 Patch on the skin every 24 hours.  Dispense: 28 Patch; Refill: 0  - nicotine (NICODERM) 14 MG/24HR PATCH 24 HR; Place 1 Patch on the skin every 24 hours.  Dispense: 28 Patch; Refill: 0  - nicotine (NICODERM) 7 MG/24HR PATCH 24 HR; Place 1 Patch on the skin every 24 hours.  Dispense: 28 Patch; Refill: 0    8. Hematochezia  Will follow up with GI for colonoscopy  - CBC WITH DIFFERENTIAL; Future    9. Anxiety and depression  He declines referral to therapy or medication at this time.  He will reach out to me via Topic if he changes his mind.    10. Gastroesophageal reflux disease, unspecified whether esophagitis present  - omeprazole (PRILOSEC) 20 MG delayed-release capsule; Take 1 Capsule by mouth every day.  Dispense: 30 Capsule; Refill: 11        Followup: Return if symptoms worsen or fail to improve.            [id number] [facility]

## 2023-03-29 NOTE — ASSESSMENT & PLAN NOTE
Still reporting regularly seeing blood in his bowel movements, typically bright red and surrounding the stool.  States that his GI told him it was likely from over wiping.  Denies any worsening in the bleeding over the past 2 years, pain associated with BM's, unintentional weight loss.  Usually has diarrhea.

## 2023-03-29 NOTE — ASSESSMENT & PLAN NOTE
He continues to drink alcohol daily.  He is taking Prilosec 20 mg daily which was recommended for lifetime use by GI.  Denies significant heartburn or dysphagia.

## 2023-03-29 NOTE — ASSESSMENT & PLAN NOTE
Still smoking about a pack a day.  Never tried the nicotine patches prescribed last year.  States that he is feeling more motivated to quit and he would like a new prescription for patches.

## 2023-03-29 NOTE — ASSESSMENT & PLAN NOTE
Reports symptoms have been present for about a year.  He wondered if it was his atorvastatin and losartan because they started around the same time he began taking these medications, but he also reports a lot of life changes  And stressors.  At this point, he is not interested in starting on any medication or seeing a therapist.

## 2023-03-29 NOTE — ASSESSMENT & PLAN NOTE
Continues on losartan 50 mg daily.  Blood pressure mildly elevated today at 130/90 but he reports majority of home readings in the 120s over 70s to 80s.

## 2023-03-29 NOTE — ASSESSMENT & PLAN NOTE
Last A1c was checked about 11 months ago and was 6.1 with dietary and lifestyle modifications down from 7.4 previously.  He states that his diet has gotten a little more relaxed since then but he has not gained any additional weight.  Not currently on any medication for glycemic control.

## 2023-06-10 LAB
ALBUMIN SERPL-MCNC: 4.9 G/DL (ref 4–5)
ALBUMIN/CREAT UR: 13 MG/G CREAT (ref 0–29)
ALBUMIN/GLOB SERPL: 2.2 {RATIO} (ref 1.2–2.2)
ALP SERPL-CCNC: 64 IU/L (ref 44–121)
ALT SERPL-CCNC: 39 IU/L (ref 0–44)
AST SERPL-CCNC: 31 IU/L (ref 0–40)
BASOPHILS # BLD AUTO: 0.1 X10E3/UL (ref 0–0.2)
BASOPHILS NFR BLD AUTO: 1 %
BILIRUB SERPL-MCNC: 0.3 MG/DL (ref 0–1.2)
BUN SERPL-MCNC: 14 MG/DL (ref 6–24)
BUN/CREAT SERPL: 17 (ref 9–20)
CALCIUM SERPL-MCNC: 9.4 MG/DL (ref 8.7–10.2)
CHLORIDE SERPL-SCNC: 104 MMOL/L (ref 96–106)
CHOLEST SERPL-MCNC: 210 MG/DL (ref 100–199)
CO2 SERPL-SCNC: 21 MMOL/L (ref 20–29)
CREAT SERPL-MCNC: 0.82 MG/DL (ref 0.76–1.27)
CREAT UR-MCNC: 171.9 MG/DL
EGFRCR SERPLBLD CKD-EPI 2021: 108 ML/MIN/1.73
EOSINOPHIL # BLD AUTO: 0.3 X10E3/UL (ref 0–0.4)
EOSINOPHIL NFR BLD AUTO: 6 %
ERYTHROCYTE [DISTWIDTH] IN BLOOD BY AUTOMATED COUNT: 13.4 % (ref 11.6–15.4)
GLOBULIN SER CALC-MCNC: 2.2 G/DL (ref 1.5–4.5)
GLUCOSE SERPL-MCNC: 138 MG/DL (ref 70–99)
HBA1C MFR BLD: 6.4 % (ref 4.8–5.6)
HCT VFR BLD AUTO: 39.8 % (ref 37.5–51)
HCV IGG SERPL QL IA: NON REACTIVE
HDLC SERPL-MCNC: 67 MG/DL
HGB BLD-MCNC: 13 G/DL (ref 13–17.7)
IMM GRANULOCYTES # BLD AUTO: 0 X10E3/UL (ref 0–0.1)
IMM GRANULOCYTES NFR BLD AUTO: 0 %
IMMATURE CELLS  115398: NORMAL
LABORATORY COMMENT REPORT: ABNORMAL
LDLC SERPL CALC-MCNC: 131 MG/DL (ref 0–99)
LYMPHOCYTES # BLD AUTO: 1.8 X10E3/UL (ref 0.7–3.1)
LYMPHOCYTES NFR BLD AUTO: 29 %
MCH RBC QN AUTO: 27.7 PG (ref 26.6–33)
MCHC RBC AUTO-ENTMCNC: 32.7 G/DL (ref 31.5–35.7)
MCV RBC AUTO: 85 FL (ref 79–97)
MICROALBUMIN UR-MCNC: 21.6 UG/ML
MONOCYTES # BLD AUTO: 0.5 X10E3/UL (ref 0.1–0.9)
MONOCYTES NFR BLD AUTO: 8 %
MORPHOLOGY BLD-IMP: NORMAL
NEUTROPHILS # BLD AUTO: 3.4 X10E3/UL (ref 1.4–7)
NEUTROPHILS NFR BLD AUTO: 56 %
NRBC BLD AUTO-RTO: NORMAL %
PLATELET # BLD AUTO: 237 X10E3/UL (ref 150–450)
POTASSIUM SERPL-SCNC: 4.8 MMOL/L (ref 3.5–5.2)
PROT SERPL-MCNC: 7.1 G/DL (ref 6–8.5)
RBC # BLD AUTO: 4.69 X10E6/UL (ref 4.14–5.8)
SODIUM SERPL-SCNC: 141 MMOL/L (ref 134–144)
TRIGL SERPL-MCNC: 66 MG/DL (ref 0–149)
VLDLC SERPL CALC-MCNC: 12 MG/DL (ref 5–40)
WBC # BLD AUTO: 6 X10E3/UL (ref 3.4–10.8)

## 2023-11-13 DIAGNOSIS — E78.00 PURE HYPERCHOLESTEROLEMIA: ICD-10-CM

## 2023-11-13 DIAGNOSIS — E11.9 TYPE 2 DIABETES MELLITUS WITHOUT COMPLICATION, WITHOUT LONG-TERM CURRENT USE OF INSULIN (HCC): ICD-10-CM

## 2023-11-15 RX ORDER — ATORVASTATIN CALCIUM 40 MG/1
40 TABLET, FILM COATED ORAL EVERY EVENING
Qty: 30 TABLET | Refills: 4 | Status: SHIPPED | OUTPATIENT
Start: 2023-11-15

## 2024-04-22 DIAGNOSIS — E11.9 TYPE 2 DIABETES MELLITUS WITHOUT COMPLICATION, WITHOUT LONG-TERM CURRENT USE OF INSULIN (HCC): ICD-10-CM

## 2024-04-22 DIAGNOSIS — K22.70 BARRETT'S ESOPHAGUS WITHOUT DYSPLASIA: ICD-10-CM

## 2024-04-22 DIAGNOSIS — I10 PRIMARY HYPERTENSION: ICD-10-CM

## 2024-04-22 DIAGNOSIS — K21.9 GASTROESOPHAGEAL REFLUX DISEASE, UNSPECIFIED WHETHER ESOPHAGITIS PRESENT: ICD-10-CM

## 2024-04-22 NOTE — TELEPHONE ENCOUNTER
Received request via: Pharmacy    Was the patient seen in the last year in this department? Yes    Does the patient have an active prescription (recently filled or refills available) for medication(s) requested? No    Pharmacy Name: [facility]    Does the patient have [facility] Plus and need 100 day supply (blood pressure, diabetes and cholesterol meds only)? Patient does not have SCP

## 2024-04-23 RX ORDER — OMEPRAZOLE 20 MG/1
20 CAPSULE, DELAYED RELEASE ORAL DAILY
Qty: 30 CAPSULE | Refills: 0 | Status: SHIPPED | OUTPATIENT
Start: 2024-04-23

## 2024-04-23 RX ORDER — LOSARTAN POTASSIUM 50 MG/1
50 TABLET ORAL DAILY
Qty: 30 TABLET | Refills: 0 | Status: SHIPPED | OUTPATIENT
Start: 2024-04-23

## 2024-05-26 DIAGNOSIS — E11.9 TYPE 2 DIABETES MELLITUS WITHOUT COMPLICATION, WITHOUT LONG-TERM CURRENT USE OF INSULIN (HCC): ICD-10-CM

## 2024-05-26 DIAGNOSIS — I10 PRIMARY HYPERTENSION: ICD-10-CM

## 2024-05-28 NOTE — TELEPHONE ENCOUNTER
Received request via: Pharmacy    Was the patient seen in the last year in this department? Yes    Does the patient have an active prescription (recently filled or refills available) for medication(s) requested? No    Pharmacy Name: [facility]    Does the patient have detention Plus and need 100 day supply (blood pressure, diabetes and cholesterol meds only)? Patient does not have SCP

## 2024-05-29 RX ORDER — LOSARTAN POTASSIUM 50 MG/1
50 TABLET ORAL DAILY
Qty: 30 TABLET | Refills: 0 | Status: SHIPPED | OUTPATIENT
Start: 2024-05-29

## 2024-07-13 DIAGNOSIS — I10 PRIMARY HYPERTENSION: ICD-10-CM

## 2024-07-13 DIAGNOSIS — E11.9 TYPE 2 DIABETES MELLITUS WITHOUT COMPLICATION, WITHOUT LONG-TERM CURRENT USE OF INSULIN (HCC): ICD-10-CM

## 2024-07-16 RX ORDER — LOSARTAN POTASSIUM 50 MG/1
50 TABLET ORAL DAILY
Qty: 30 TABLET | Refills: 0 | OUTPATIENT
Start: 2024-07-16

## 2024-09-24 ENCOUNTER — OFFICE VISIT (OUTPATIENT)
Dept: URGENT CARE | Facility: CLINIC | Age: 49
End: 2024-09-24
Payer: COMMERCIAL

## 2024-09-24 VITALS
HEART RATE: 87 BPM | BODY MASS INDEX: 24.05 KG/M2 | WEIGHT: 168 LBS | RESPIRATION RATE: 14 BRPM | SYSTOLIC BLOOD PRESSURE: 152 MMHG | HEIGHT: 70 IN | OXYGEN SATURATION: 96 % | TEMPERATURE: 97.3 F | DIASTOLIC BLOOD PRESSURE: 102 MMHG

## 2024-09-24 DIAGNOSIS — I10 PRIMARY HYPERTENSION: ICD-10-CM

## 2024-09-24 DIAGNOSIS — L02.211 ABSCESS OF SKIN OF ABDOMEN: ICD-10-CM

## 2024-09-24 PROCEDURE — 99213 OFFICE O/P EST LOW 20 MIN: CPT | Performed by: NURSE PRACTITIONER

## 2024-09-24 PROCEDURE — 3080F DIAST BP >= 90 MM HG: CPT | Performed by: NURSE PRACTITIONER

## 2024-09-24 PROCEDURE — 3077F SYST BP >= 140 MM HG: CPT | Performed by: NURSE PRACTITIONER

## 2024-09-24 RX ORDER — LOSARTAN POTASSIUM 50 MG/1
50 TABLET ORAL DAILY
Qty: 30 TABLET | Refills: 2 | Status: SHIPPED | OUTPATIENT
Start: 2024-09-24 | End: 2024-12-23

## 2024-09-24 RX ORDER — SULFAMETHOXAZOLE/TRIMETHOPRIM 800-160 MG
1 TABLET ORAL 2 TIMES DAILY
Qty: 14 TABLET | Refills: 0 | Status: SHIPPED | OUTPATIENT
Start: 2024-09-24 | End: 2024-10-01

## 2024-09-24 ASSESSMENT — ENCOUNTER SYMPTOMS
SHORTNESS OF BREATH: 0
ORTHOPNEA: 0
FEVER: 0
PALPITATIONS: 0
CHILLS: 0

## 2024-09-24 ASSESSMENT — FIBROSIS 4 INDEX: FIB4 SCORE: 1.03

## 2024-09-24 NOTE — PROGRESS NOTES
Subjective     Bernabe Horn III is a 48 y.o. male who presents with Mass (In groin area )            Bernabe presents to urgent care with a complaint of a painful bump on the skin of his abdomen at his belt line.  He first noticed it approximately 4 to 5 months ago.  He reports that it became firm and more tender over the past month.  And now it is erythematous and leaking bloody pus.  He denies any fever, myalgias, chills, or red streaks coming from the affected area.  Denies any overt trauma or wound as a preceding injury.  No history of abscess or sepsis.  He has not tried any home measures such as squeezing or lancing the affected area.  Medical history significant for hypertension.  He reports he ran out of his high blood pressure meds several months ago and he is hypertensive in clinic.  He denies any chest pain or shortness of breath.  He would be interested in resuming his losartan 50 mg daily as he was tolerating this well and reported that he was having good results at reaching normotensive goals with this medication.  He also has GERD with Garvey's esophagus and takes omeprazole.  He has alcohol use disorder and reports that he drinks approximately 12 shots of liquor per night.  He has OCD, insomnia, hyperlipidemia, tobacco use, a history of hiatal hernia and diverticulosis, MARIA D, anxiety and depression, OCD, and type 2 diabetes.    Current Outpatient Medications on File Prior to Visit:  omeprazole (PRILOSEC) 20 MG delayed-release capsule, TAKE ONE CAPSULE BY MOUTH DAILY, Disp: 30 Capsule, Rfl: 0  atorvastatin (LIPITOR) 40 MG Tab, Take 1 Tablet by mouth every evening., Disp: 30 Tablet, Rfl: 4  nicotine (NICODERM) 21 MG/24HR PATCH 24 HR, Place 1 Patch on the skin every 24 hours. (Patient not taking: Reported on 9/24/2024), Disp: 28 Patch, Rfl: 0  nicotine (NICODERM) 14 MG/24HR PATCH 24 HR, Place 1 Patch on the skin every 24 hours. (Patient not taking: Reported on 9/24/2024), Disp: 28 Patch, Rfl:  "0  nicotine (NICODERM) 7 MG/24HR PATCH 24 HR, Place 1 Patch on the skin every 24 hours. (Patient not taking: Reported on 9/24/2024), Disp: 28 Patch, Rfl: 0    No current facility-administered medications on file prior to visit.            Review of Systems   Constitutional:  Negative for chills, fever and malaise/fatigue.   Respiratory:  Negative for shortness of breath.    Cardiovascular:  Negative for chest pain, palpitations and orthopnea.   Skin:  Negative for itching and rash.     Medications, Allergies, and current problem list reviewed today in Epic         Objective     BP (!) 152/102   Pulse 87   Temp 36.3 °C (97.3 °F) (Temporal)   Resp 14   Ht 1.778 m (5' 10\")   Wt 76.2 kg (168 lb)   SpO2 96%   BMI 24.11 kg/m²      Physical Exam  Vitals reviewed.   Constitutional:       General: He is not in acute distress.     Appearance: Normal appearance. He is not ill-appearing or toxic-appearing.   Cardiovascular:      Rate and Rhythm: Normal rate and regular rhythm.      Heart sounds: Normal heart sounds.   Pulmonary:      Effort: Pulmonary effort is normal.      Breath sounds: Normal breath sounds.   Abdominal:          Comments: Left lower abdomen region demonstrates an erythematous firm nodule with two 2-3 mm open, draining areas.  Palpation produces bloody purulent drainage.  TTP.  No vesicles or red streaks.   Affected area palpated and expressed, then irrigated with sterile saline until no further bloody or purulent material could be expressed and drainage ran clear.  Patient tolerated well.             Neurological:      Mental Status: He is alert and oriented to person, place, and time.   Psychiatric:         Mood and Affect: Mood normal.                             Assessment & Plan        Assessment & Plan  Abscess of skin of abdomen    Orders:    sulfamethoxazole-trimethoprim (BACTRIM DS) 800-160 MG tablet; Take 1 Tablet by mouth 2 times a day for 7 days.  Discussed exam findings with Bernabe.  " Differential reviewed.  Take full course of antibiotics.  OTC NSAIDs or tylenol prn pain.  Warm compress as needed.    RTC in 7 days if symptoms persist, sooner if worse.  ED precautions reviewed.    Advised symptoms may recur in the future and if this happens, consider I and D or possible underlying cyst removal.    Primary hypertension    Orders:    losartan (COZAAR) 50 MG Tab; Take 1 Tablet by mouth every day for 90 days.    Discussed hypertension health risks with Bernabe.  Reviewed his blood pressure reading today.  He is amenable to resuming his losartan as prescribed.  He will follow up with a PCP within 1 month for routine health care, screening, and maintenance.    He verbalized understanding of and agreed with plan of care.

## 2024-09-24 NOTE — ASSESSMENT & PLAN NOTE
Orders:    losartan (COZAAR) 50 MG Tab; Take 1 Tablet by mouth every day for 90 days.    Discussed hypertension health risks with Bernabe.  Reviewed his blood pressure reading today.  He is amenable to resuming his losartan as prescribed.  He will follow up with a PCP within 1 month for routine health care, screening, and maintenance.    He verbalized understanding of and agreed with plan of care.

## 2024-10-16 DIAGNOSIS — E78.00 PURE HYPERCHOLESTEROLEMIA: ICD-10-CM

## 2024-10-16 DIAGNOSIS — E11.9 TYPE 2 DIABETES MELLITUS WITHOUT COMPLICATION, WITHOUT LONG-TERM CURRENT USE OF INSULIN (HCC): ICD-10-CM

## 2024-10-16 RX ORDER — ATORVASTATIN CALCIUM 40 MG/1
40 TABLET, FILM COATED ORAL EVERY EVENING
Qty: 30 TABLET | Refills: 4 | OUTPATIENT
Start: 2024-10-16

## 2024-12-11 ENCOUNTER — HOSPITAL ENCOUNTER (OUTPATIENT)
Dept: LAB | Facility: MEDICAL CENTER | Age: 49
End: 2024-12-11
Attending: FAMILY MEDICINE
Payer: COMMERCIAL

## 2024-12-11 LAB
ALBUMIN SERPL BCP-MCNC: 4.5 G/DL (ref 3.2–4.9)
ALBUMIN/GLOB SERPL: 1.6 G/DL
ALP SERPL-CCNC: 68 U/L (ref 30–99)
ALT SERPL-CCNC: 33 U/L (ref 2–50)
ANION GAP SERPL CALC-SCNC: 11 MMOL/L (ref 7–16)
AST SERPL-CCNC: 34 U/L (ref 12–45)
BILIRUB SERPL-MCNC: 0.4 MG/DL (ref 0.1–1.5)
BUN SERPL-MCNC: 11 MG/DL (ref 8–22)
CALCIUM ALBUM COR SERPL-MCNC: 9 MG/DL (ref 8.5–10.5)
CALCIUM SERPL-MCNC: 9.4 MG/DL (ref 8.5–10.5)
CHLORIDE SERPL-SCNC: 100 MMOL/L (ref 96–112)
CHOLEST SERPL-MCNC: 263 MG/DL (ref 100–199)
CO2 SERPL-SCNC: 27 MMOL/L (ref 20–33)
CREAT SERPL-MCNC: 0.76 MG/DL (ref 0.5–1.4)
EST. AVERAGE GLUCOSE BLD GHB EST-MCNC: 154 MG/DL
GFR SERPLBLD CREATININE-BSD FMLA CKD-EPI: 109 ML/MIN/1.73 M 2
GLOBULIN SER CALC-MCNC: 2.8 G/DL (ref 1.9–3.5)
GLUCOSE SERPL-MCNC: 134 MG/DL (ref 65–99)
HBA1C MFR BLD: 7 % (ref 4–5.6)
HDLC SERPL-MCNC: 53 MG/DL
LDLC SERPL CALC-MCNC: 169 MG/DL
POTASSIUM SERPL-SCNC: 4.3 MMOL/L (ref 3.6–5.5)
PROT SERPL-MCNC: 7.3 G/DL (ref 6–8.2)
PSA SERPL DL<=0.01 NG/ML-MCNC: 1.45 NG/ML (ref 0–4)
SODIUM SERPL-SCNC: 138 MMOL/L (ref 135–145)
TRIGL SERPL-MCNC: 204 MG/DL (ref 0–149)

## 2024-12-11 PROCEDURE — 36415 COLL VENOUS BLD VENIPUNCTURE: CPT

## 2024-12-11 PROCEDURE — 83036 HEMOGLOBIN GLYCOSYLATED A1C: CPT

## 2024-12-11 PROCEDURE — 80061 LIPID PANEL: CPT

## 2024-12-11 PROCEDURE — 84153 ASSAY OF PSA TOTAL: CPT

## 2024-12-11 PROCEDURE — 80053 COMPREHEN METABOLIC PANEL: CPT

## 2025-02-03 ENCOUNTER — HOSPITAL ENCOUNTER (OUTPATIENT)
Dept: LAB | Facility: MEDICAL CENTER | Age: 50
End: 2025-02-03
Attending: FAMILY MEDICINE
Payer: COMMERCIAL

## 2025-02-03 LAB
ALBUMIN SERPL BCP-MCNC: 4.7 G/DL (ref 3.2–4.9)
ALBUMIN/GLOB SERPL: 1.8 G/DL
ALP SERPL-CCNC: 61 U/L (ref 30–99)
ALT SERPL-CCNC: 78 U/L (ref 2–50)
ANION GAP SERPL CALC-SCNC: 12 MMOL/L (ref 7–16)
AST SERPL-CCNC: 42 U/L (ref 12–45)
BILIRUB SERPL-MCNC: 0.3 MG/DL (ref 0.1–1.5)
BUN SERPL-MCNC: 16 MG/DL (ref 8–22)
CALCIUM ALBUM COR SERPL-MCNC: 9.3 MG/DL (ref 8.5–10.5)
CALCIUM SERPL-MCNC: 9.9 MG/DL (ref 8.5–10.5)
CHLORIDE SERPL-SCNC: 103 MMOL/L (ref 96–112)
CHOLEST SERPL-MCNC: 226 MG/DL (ref 100–199)
CO2 SERPL-SCNC: 24 MMOL/L (ref 20–33)
CREAT SERPL-MCNC: 0.95 MG/DL (ref 0.5–1.4)
GFR SERPLBLD CREATININE-BSD FMLA CKD-EPI: 97 ML/MIN/1.73 M 2
GLOBULIN SER CALC-MCNC: 2.6 G/DL (ref 1.9–3.5)
GLUCOSE SERPL-MCNC: 122 MG/DL (ref 65–99)
HDLC SERPL-MCNC: 54 MG/DL
LDLC SERPL CALC-MCNC: 143 MG/DL
POTASSIUM SERPL-SCNC: 4.6 MMOL/L (ref 3.6–5.5)
PROT SERPL-MCNC: 7.3 G/DL (ref 6–8.2)
SODIUM SERPL-SCNC: 139 MMOL/L (ref 135–145)
TRIGL SERPL-MCNC: 145 MG/DL (ref 0–149)

## 2025-02-03 PROCEDURE — 36415 COLL VENOUS BLD VENIPUNCTURE: CPT

## 2025-02-03 PROCEDURE — 80061 LIPID PANEL: CPT

## 2025-02-03 PROCEDURE — 80053 COMPREHEN METABOLIC PANEL: CPT

## 2025-03-31 ENCOUNTER — HOSPITAL ENCOUNTER (OUTPATIENT)
Dept: LAB | Facility: MEDICAL CENTER | Age: 50
End: 2025-03-31
Attending: FAMILY MEDICINE
Payer: COMMERCIAL

## 2025-03-31 LAB
ALBUMIN SERPL BCP-MCNC: 4.5 G/DL (ref 3.2–4.9)
ALBUMIN/GLOB SERPL: 1.8 G/DL
ALP SERPL-CCNC: 66 U/L (ref 30–99)
ALT SERPL-CCNC: 46 U/L (ref 2–50)
ANION GAP SERPL CALC-SCNC: 13 MMOL/L (ref 7–16)
AST SERPL-CCNC: 33 U/L (ref 12–45)
BILIRUB SERPL-MCNC: 0.4 MG/DL (ref 0.1–1.5)
BUN SERPL-MCNC: 9 MG/DL (ref 8–22)
CALCIUM ALBUM COR SERPL-MCNC: 9.1 MG/DL (ref 8.5–10.5)
CALCIUM SERPL-MCNC: 9.5 MG/DL (ref 8.5–10.5)
CHLORIDE SERPL-SCNC: 104 MMOL/L (ref 96–112)
CHOLEST SERPL-MCNC: 190 MG/DL (ref 100–199)
CO2 SERPL-SCNC: 23 MMOL/L (ref 20–33)
CREAT SERPL-MCNC: 0.85 MG/DL (ref 0.5–1.4)
EST. AVERAGE GLUCOSE BLD GHB EST-MCNC: 123 MG/DL
FASTING STATUS PATIENT QL REPORTED: NORMAL
GFR SERPLBLD CREATININE-BSD FMLA CKD-EPI: 106 ML/MIN/1.73 M 2
GLOBULIN SER CALC-MCNC: 2.5 G/DL (ref 1.9–3.5)
GLUCOSE SERPL-MCNC: 113 MG/DL (ref 65–99)
HBA1C MFR BLD: 5.9 % (ref 4–5.6)
HDLC SERPL-MCNC: 61 MG/DL
LDLC SERPL CALC-MCNC: 101 MG/DL
POTASSIUM SERPL-SCNC: 4.2 MMOL/L (ref 3.6–5.5)
PROT SERPL-MCNC: 7 G/DL (ref 6–8.2)
SODIUM SERPL-SCNC: 140 MMOL/L (ref 135–145)
TRIGL SERPL-MCNC: 142 MG/DL (ref 0–149)

## 2025-03-31 PROCEDURE — 80061 LIPID PANEL: CPT

## 2025-03-31 PROCEDURE — 83036 HEMOGLOBIN GLYCOSYLATED A1C: CPT

## 2025-03-31 PROCEDURE — 80053 COMPREHEN METABOLIC PANEL: CPT

## 2025-03-31 PROCEDURE — 36415 COLL VENOUS BLD VENIPUNCTURE: CPT
